# Patient Record
Sex: FEMALE | Race: WHITE
[De-identification: names, ages, dates, MRNs, and addresses within clinical notes are randomized per-mention and may not be internally consistent; named-entity substitution may affect disease eponyms.]

---

## 2018-02-21 ENCOUNTER — HOSPITAL ENCOUNTER (INPATIENT)
Dept: HOSPITAL 45 - C.EDB | Age: 58
LOS: 5 days | Discharge: HOME | DRG: 194 | End: 2018-02-26
Attending: HOSPITALIST | Admitting: HOSPITALIST
Payer: COMMERCIAL

## 2018-02-21 VITALS
SYSTOLIC BLOOD PRESSURE: 122 MMHG | OXYGEN SATURATION: 91 % | DIASTOLIC BLOOD PRESSURE: 70 MMHG | HEART RATE: 95 BPM | TEMPERATURE: 98.24 F

## 2018-02-21 VITALS — OXYGEN SATURATION: 90 % | HEART RATE: 93 BPM

## 2018-02-21 VITALS
HEIGHT: 61 IN | WEIGHT: 156.53 LBS | HEIGHT: 61 IN | BODY MASS INDEX: 29.55 KG/M2 | BODY MASS INDEX: 29.55 KG/M2 | WEIGHT: 156.53 LBS

## 2018-02-21 DIAGNOSIS — F32.9: ICD-10-CM

## 2018-02-21 DIAGNOSIS — J18.9: ICD-10-CM

## 2018-02-21 DIAGNOSIS — J10.1: Primary | ICD-10-CM

## 2018-02-21 DIAGNOSIS — J44.1: ICD-10-CM

## 2018-02-21 DIAGNOSIS — R09.02: ICD-10-CM

## 2018-02-21 DIAGNOSIS — K21.9: ICD-10-CM

## 2018-02-21 DIAGNOSIS — F17.200: ICD-10-CM

## 2018-02-21 LAB
ALBUMIN SERPL-MCNC: 3.7 GM/DL (ref 3.4–5)
ALP SERPL-CCNC: 81 U/L (ref 45–117)
ALT SERPL-CCNC: 26 U/L (ref 12–78)
AST SERPL-CCNC: 25 U/L (ref 15–37)
BASOPHILS # BLD: 0.02 K/UL (ref 0–0.2)
BASOPHILS NFR BLD: 0.2 %
BUN SERPL-MCNC: 12 MG/DL (ref 7–18)
CA-I BLD-SCNC: 1.06 MMOL/L (ref 1.12–1.32)
CALCIUM SERPL-MCNC: 8.8 MG/DL (ref 8.5–10.1)
CK MB SERPL-MCNC: 1.7 NG/ML (ref 0.5–3.6)
CO2 SERPL-SCNC: 25 MMOL/L (ref 21–32)
CREAT BLD-MCNC: 0.5 MG/DL (ref 0.6–1.3)
CREAT SERPL-MCNC: 0.68 MG/DL (ref 0.6–1.2)
EOS ABS #: 0.02 K/UL (ref 0–0.5)
EOSINOPHIL NFR BLD AUTO: 161 K/UL (ref 130–400)
GLUCOSE SERPL-MCNC: 89 MG/DL (ref 70–99)
HCT VFR BLD CALC: 41.7 % (ref 37–47)
HGB BLD-MCNC: 14.2 G/DL (ref 12–16)
IG#: 0.03 K/UL (ref 0–0.02)
IMM GRANULOCYTES NFR BLD AUTO: 5.1 %
INFLUENZA B ANTIGEN: (no result)
INR PPP: 1 (ref 0.9–1.1)
ISTAT POTASSIUM: 3.9 MEQ/L (ref 3.3–5)
LYMPHOCYTES # BLD: 0.47 K/UL (ref 1.2–3.4)
MCH RBC QN AUTO: 32.7 PG (ref 25–34)
MCHC RBC AUTO-ENTMCNC: 34.1 G/DL (ref 32–36)
MCV RBC AUTO: 96.1 FL (ref 80–100)
MONO ABS #: 0.54 K/UL (ref 0.11–0.59)
MONOCYTES NFR BLD: 5.9 %
NEUT ABS #: 8.14 K/UL (ref 1.4–6.5)
NEUTROPHILS # BLD AUTO: 0.2 %
NEUTROPHILS NFR BLD AUTO: 88.3 %
PMV BLD AUTO: 9.6 FL (ref 7.4–10.4)
POTASSIUM SERPL-SCNC: 3.6 MMOL/L (ref 3.5–5.1)
PROT SERPL-MCNC: 7.3 GM/DL (ref 6.4–8.2)
PTT PATIENT: 28.4 SECONDS (ref 21–31)
RED CELL DISTRIBUTION WIDTH CV: 14.1 % (ref 11.5–14.5)
RED CELL DISTRIBUTION WIDTH SD: 49.8 FL (ref 36.4–46.3)
SODIUM BLD-SCNC: 136 MEQ/L (ref 135–144)
SODIUM SERPL-SCNC: 135 MMOL/L (ref 136–145)
WBC # BLD AUTO: 9.22 K/UL (ref 4.8–10.8)

## 2018-02-21 RX ADMIN — IPRATROPIUM BROMIDE SCH MG: 0.5 SOLUTION RESPIRATORY (INHALATION) at 19:30

## 2018-02-21 RX ADMIN — OSELTAMIVIR PHOSPHATE SCH MG: 75 CAPSULE ORAL at 18:55

## 2018-02-21 RX ADMIN — GUAIFENESIN AND CODEINE PHOSPHATE PRN ML: 10; 100 LIQUID ORAL at 22:37

## 2018-02-21 RX ADMIN — LEVALBUTEROL SCH MG: 1.25 SOLUTION, CONCENTRATE RESPIRATORY (INHALATION) at 19:30

## 2018-02-21 RX ADMIN — PIPERACILLIN AND TAZOBACTAM SCH MLS/HR: 3; .375 INJECTION, POWDER, LYOPHILIZED, FOR SOLUTION INTRAVENOUS; PARENTERAL at 22:34

## 2018-02-21 RX ADMIN — METHYLPREDNISOLONE SODIUM SUCCINATE SCH MLS/MIN: 1 INJECTION, POWDER, FOR SOLUTION INTRAMUSCULAR; INTRAVENOUS at 22:34

## 2018-02-21 NOTE — EMERGENCY ROOM VISIT NOTE
History


Report prepared by Lam[place]:  Rafa Card


Under the Supervision of:  Dr. Toni Stahl D.O.


First contact with patient:  13:26


Chief Complaint:  FLU LIKE SX


Stated Complaint:  FLU LIKE SX





History of Present Illness


The patient is a 57 year old female who presents to the Emergency Room with 

complaints of moderate flu-like symptoms that began two days ago. She has a 

past medical history of COPD. At this time, the patient woke up in the morning 

and began to have intermittent episodes of vomiting. Yesterday, she thought she 

was febrile, but did not take her temperature. She then began to experience a 

headache, productive cough with yellow/clear sputum, and rhinorrhea. Recently, 

she has been experiencing diffuse body aches, some mild abdominal pain, 

shortness of breath, and some chest tightness that feels similarly to when her 

COPD exacerbations are severe. Pt denies headache, change in vision, fevers, 

nausea, diarrhea, pain with urination, and melena.





   Source of History:  patient


   Onset:  two days ago


   Position:  other (Global)


   Symptom Intensity:  moderate


   Quality:  other (Flu-like symptoms)


   Timing:  constant


   Associated Symptoms:  + cough, + chest pain (tightness), + SOB, + vomiting, 

+ abdominal pain, No fevers, No headache, No melena, No diarrhea, No urinary 

symptoms


Note:


She is experiencing body aches.





Review of Systems


See HPI for pertinent positives & negatives. A total of 10 systems reviewed and 

were otherwise negative.





Past Medical & Surgical


Medical Problems:


(1) COPD (chronic obstructive pulmonary disease)


(2) Fibromyalgia


(3) HLD (hyperlipidemia)


(4) HTN (hypertension)


(5) Influenza A


Surgical Problems:


(1) No pertinent past surgical history


Social History Problems:


(1) Active smoker








Family History





Depression


Diabetes mellitus





Social History


Smoking Status:  Current Every Day Smoker


Drug Use:  none


Marital Status:  


Housing Status:  lives with family


Occupation Status:  unemployed





Current/Historical Medications


Scheduled


Albuterol Hfa (Ventolin Hfa), 2 PUFFS INH Q4H


Citalopram (Citalopram Hydrobromide), 40 MG PO DAILY


Fluticasone Furoate-Vilanterol (Breo Ellipta), 1 PUFF INH QAM


Omeprazole (Omeprazole), 1 TAB PO DAILY


Umeclidinium Bromide (Incruse Ellipta), 1 PUFF INH DAILY





Scheduled PRN


Albuterol Sulfate (Proair Respiclick), 2 PUFFS INH QID PRN for SOB/Wheezing


Ibuprofen (Advil), 400-600 MG PO Q6H PRN for Pain


Ipratropium-Albuterol (Duoneb), 1 TREATMENT INH Q6H PRN for SOB/Wheezing





Allergies


Coded Allergies:  


     ACE Inhibitors (Verified  Allergy, Unknown, UNKNOWN RXN, 2/21/18)


     Zolpidem (Verified  Adverse Reaction, Intermediate, other, 2/21/18)


 irritability, restless legs, wanted to wake up at night


 during sleep





Physical Exam


Vital Signs











  Date Time  Temp Pulse Resp B/P (MAP) Pulse Ox O2 Delivery O2 Flow Rate FiO2


 


2/21/18 14:46  103 15 104/66 92 Nasal Cannula 2.0 


 


2/21/18 14:07  110      


 


2/21/18 13:27     93 Nasal Cannula 4.0 


 


2/21/18 13:22 38.0 118 40 139/72 80 Room Air  











Physical Exam


GENERAL: Sitting up in bed, dyspneic with conversation, significant distress, 

non-toxic 


EYE EXAM: normal conjunctiva.


OROPHARYNX: no exudate, no erythema, lips, buccal mucosa, and tongue normal and 

mucous membranes are moist


NECK: supple, no nuchal rigidity, no adenopathy, non-tender, no JVD


LUNGS: Wheezing bilaterally. Normal chest wall mechanics


HEART: Tachycardic rate with a regular rhythm, no murmurs, S1 normal and S2 

normal 


ABDOMEN: abdomen soft, non-tender, normo-active bowel sounds, no masses, no 

rebound or guarding. 


BACK: Back is symmetrical on inspection and there is no deformity, no midline 

tenderness, no CVA tenderness. 


SKIN: no rashes and no bruising 


UPPER EXTREMITIES: upper extremities are grossly normal. 


LOWER EXTREMITIES: No pitting edema.


NEURO EXAM: Normal sensorium, cranial nerves II-XII grossly intact, normal 

speech,  no gross weakness of arms, no gross weakness of legs.





Medical Decision & Procedures


ER Provider


Diagnostic Interpretation:


Radiology results as stated below per my review and the radiologist's 

interpretation: 





CHEST ONE VIEW PORTABLE





CLINICAL HISTORY: Fever.    





COMPARISON STUDY:  Chest radiograph April 13, 2017.





FINDINGS: There is no pneumothorax or pleural effusion. There is no evidence for


pulmonary edema. Cardiac size is normal. Mediastinal contours are normal. There


is mild reticulonodular interstitial thickening within the right lower lung.


There is no lobar consolidation. There is no cavitation. 





IMPRESSION:  Mild right lower lung reticulonodular interstitial thickening which


may reflect a mild infectious process such as bronchiolitis. 





Electronically signed by:  Pardeep Perla M.D.


2/21/2018 1:56 PM





Dictated Date/Time:  2/21/2018 1:54 PM





Laboratory Results


2/21/18 13:39








Red Blood Count 4.34, Mean Corpuscular Volume 96.1, Mean Corpuscular Hemoglobin 

32.7, Mean Corpuscular Hemoglobin Concent 34.1, Mean Platelet Volume 9.6, 

Neutrophils (%) (Auto) 88.3, Lymphocytes (%) (Auto) 5.1, Monocytes (%) (Auto) 

5.9, Eosinophils (%) (Auto) 0.2, Basophils (%) (Auto) 0.2, Neutrophils # (Auto) 

8.14, Lymphocytes # (Auto) 0.47, Monocytes # (Auto) 0.54, Eosinophils # (Auto) 

0.02, Basophils # (Auto) 0.02





2/21/18 13:39

















Test


  2/21/18


13:39 2/21/18


13:58 2/21/18


14:00 2/21/18


14:21


 


White Blood Count


  9.22 K/uL


(4.8-10.8) 


  


  


 


 


Red Blood Count


  4.34 M/uL


(4.2-5.4) 


  


  


 


 


Hemoglobin


  14.2 g/dL


(12.0-16.0) 


  


  


 


 


Hematocrit 41.7 % (37-47)    


 


Mean Corpuscular Volume


  96.1 fL


() 


  


  


 


 


Mean Corpuscular Hemoglobin


  32.7 pg


(25-34) 


  


  


 


 


Mean Corpuscular Hemoglobin


Concent 34.1 g/dl


(32-36) 


  


  


 


 


Platelet Count


  161 K/uL


(130-400) 


  


  


 


 


Mean Platelet Volume


  9.6 fL


(7.4-10.4) 


  


  


 


 


Neutrophils (%) (Auto) 88.3 %    


 


Lymphocytes (%) (Auto) 5.1 %    


 


Monocytes (%) (Auto) 5.9 %    


 


Eosinophils (%) (Auto) 0.2 %    


 


Basophils (%) (Auto) 0.2 %    


 


Neutrophils # (Auto)


  8.14 K/uL


(1.4-6.5) 


  


  


 


 


Lymphocytes # (Auto)


  0.47 K/uL


(1.2-3.4) 


  


  


 


 


Monocytes # (Auto)


  0.54 K/uL


(0.11-0.59) 


  


  


 


 


Eosinophils # (Auto)


  0.02 K/uL


(0-0.5) 


  


  


 


 


Basophils # (Auto)


  0.02 K/uL


(0-0.2) 


  


  


 


 


RDW Standard Deviation


  49.8 fL


(36.4-46.3) 


  


  


 


 


RDW Coefficient of Variation


  14.1 %


(11.5-14.5) 


  


  


 


 


Immature Granulocyte % (Auto) 0.3 %    


 


Immature Granulocyte # (Auto)


  0.03 K/uL


(0.00-0.02) 


  


  


 


 


Est Creatinine Clear Calc


Drug Dose 80.2 ml/min 


  


  


  


 


 


Estimated GFR (


American) 111.0 


  


  


  


 


 


Estimated GFR (Non-


American 95.7 


  


  


  


 


 


BUN/Creatinine Ratio 17.3 (10-20)    


 


Calcium Level


  8.8 mg/dl


(8.5-10.1) 


  


  


 


 


Magnesium Level


  2.0 mg/dl


(1.8-2.4) 


  


  


 


 


Total Bilirubin


  0.4 mg/dl


(0.2-1) 


  


  


 


 


Direct Bilirubin


  < 0.1 mg/dl


(0-0.2) 


  


  


 


 


Aspartate Amino Transf


(AST/SGOT) 25 U/L (15-37) 


  


  


  


 


 


Alanine Aminotransferase


(ALT/SGPT) 26 U/L (12-78) 


  


  


  


 


 


Alkaline Phosphatase


  81 U/L


() 


  


  


 


 


Total Creatine Kinase


  125 U/L


() 


  


  


 


 


Creatine Kinase MB


  1.7 ng/ml


(0.5-3.6) 


  


  


 


 


Creatine Kinase MB Ratio 1.4 (0-3.0)    


 


Troponin I


  < 0.015 ng/ml


(0-0.045) 


  


  


 


 


Total Protein


  7.3 gm/dl


(6.4-8.2) 


  


  


 


 


Albumin


  3.7 gm/dl


(3.4-5.0) 


  


  


 


 


Bedside Lactic Acid Venous


  


  1.11 mmol/L


(0.90-1.70) 


  


 


 


Influenza Type A Antigen


  


  


  POS for Influ


A (NEG) 


 


 


Influenza Type B Antigen


  


  


  Neg for Influ


B (NEG) 


 


 


Bedside Hemoglobin


  


  


  


  15.3 g/dl


(12.0-16.0)


 


Bedside Hematocrit    45 % (37-47) 


 


Bedside Sodium


  


  


  


  136 mEq/L


(135-144)


 


Bedside Potassium


  


  


  


  3.9 mEq/L


(3.3-5.0)


 


Bedside Chloride


  


  


  


  98 mEq/L


(101-112)


 


Bedside Total CO2


  


  


  


  26 mEq/l


(24-31)


 


Anion Gap


  


  


  


  16.0 mmol/L


(16-25)


 


Bedside Blood Urea Nitrogen


  


  


  


  13 mg/dl


(7-18)


 


Bedside Creatinine


  


  


  


  0.5 mg/dl


(0.6-1.3)


 


Bedside Glucose (other)


  


  


  


  89 mg/dl


(70-99)


 


Bedside Ionized Calcium (Heide)


  


  


  


  1.06 mmol/l


(1.12-1.32)


 


Test


  2/21/18


14:30 2/21/18


15:50 


  


 


 


Prothrombin Time


  10.5 SECONDS


(9.0-12.0) 


  


  


 


 


Prothromb Time International


Ratio 1.0 (0.9-1.1) 


  


  


  


 


 


Activated Partial


Thromboplast Time 28.4 SECONDS


(21.0-31.0) 


  


  


 


 


Partial Thromboplastin Ratio 1.1    


 


Urine Color  YELLOW   


 


Urine Appearance  CLEAR (CLEAR)   


 


Urine pH  6.0 (4.5-7.5)   


 


Urine Specific Gravity


  


  1.020


(1.000-1.030) 


  


 


 


Urine Protein  NEG (NEG)   


 


Urine Glucose (UA)  NEG (NEG)   


 


Urine Ketones  2+ (NEG)   


 


Urine Occult Blood  NEG (NEG)   


 


Urine Nitrite  NEG (NEG)   


 


Urine Bilirubin  NEG (NEG)   


 


Urine Urobilinogen  NEG (NEG)   


 


Urine Leukocyte Esterase  NEG (NEG)   


 


Urine Hyaline Casts (Auto)  0 /lpf (0-5)   


 


Urine RBC  0-4 /hpf (0-4)   


 


Urine WBC  0 /hpf (0-5)   


 


Urine Epithelial Cells


  


  10-20 /lpf


(0-5) 


  


 


 


Urine Bacteria  NEG (NEG)   





Laboratory results per my review.





Medications Administered











 Medications


  (Trade)  Dose


 Ordered  Sig/Carl


 Route  Start Time


 Stop Time Status Last Admin


Dose Admin


 


 Albuterol Sulfate


  (Ventolin 0.083%


 2.5MG/3ML Neb)  5 mg  NOW  STAT


 INH  2/21/18 13:34


 2/21/18 13:36 DC 2/21/18 13:53


5 MG


 


 Methylprednisolone


 Sodium Succinate


  (Solu-Medrol IV)  125 mg  NOW  STAT


 IV  2/21/18 13:34


 2/21/18 13:36 DC 2/21/18 13:53


125 MG


 


 Sodium Chloride  2,000 ml @ 


 999 mls/hr  Q2H1M STAT


 IV  2/21/18 13:34


 2/21/18 15:34 DC 2/21/18 13:53


999 MLS/HR


 


 Hydrocodone Bit/


 Homatropine


 Methylb


  (Hycodan Syrup)  5 ml  NOW  STAT


 PO  2/21/18 13:34


 2/21/18 13:36 DC 2/21/18 13:54


5 ML


 


 Acetaminophen


  (Tylenol Tab)  1,000 mg  NOW  STAT


 PO  2/21/18 13:35


 2/21/18 13:36 DC 2/21/18 13:53


1,000 MG


 


 Levofloxacin


  (Levaquin / D5W)  750 mg  NOW  STAT


 IV  2/21/18 13:35


 2/21/18 13:36 DC 2/21/18 13:54


750 MG











ECG Per My Interpretation


Indication:  tachycardia


Rate (beats per minute):  115


Rhythm:  sinus tachycardia


Findings:  ST depression (diffuse), other (Normal axis, prolonged QTC)





ED Course


ED COURSE: 


Vital signs were reviewed and showed tachycardia, hypoxia, and febrile


The patients medical record was reviewed


The above diagnostic studies were performed and reviewed.


ED treatments and interventions as stated above. 





1329: The patient was evaluated in room C6. A complete history and physical 

examination was performed.





1334: Ordered Hycodan Syrup 5 ml PO, Sodium Chloride 2000 ml @ 999 mls/hr IV, 

Solu-Medrol  mg IV, Albuterol Sulfate 5 mg INH





1335: Ordered Levofloxacin 750 mg IV, Tylenol Tab 1000 mg PO





1443: I updated the patient at this time.





1500: Ordered Tamiflu Susp 75 mg PO





1502: Upon reevaluation, the patient is resting.I discussed my findings with 

the patient and she understands and agrees with the treatment plan.   


Based on the patients age, coexisting illnesses, exam and lab findings the 

decision to treat as an inpatient was made.


The patient remained stable while under my care.





The patient will be evaluated by Marcy Montanez, for 

further management.





Medical Decision


Differential diagnoses includes but is not limited to pneumonia, bronchitis, 

COPD/Asthma exacerbation, pneumothorax, pulmonary embolism, congestive heart 

failure, acute coronary syndrome.





Patient is a 57-year-old female who presents to ER in severe respiratory 

distress.  Pulse ox was found to be 80% and she is tachycardic and febrile.  

She is dyspneic with conversation.  She was given multiple nebs, steroids and 

antibiotics.  Symptoms did improve significantly.  She was positive for 

influenza.  She remained on 4-5 L nasal cannula.  She was admitted to internal 

medicine with likely COPD exacerbation secondary to influenza.  Patient was 

monitored closely throughout her stay within the ER.





Medication Reconcilliation


Current Medication List:  was personally reviewed by me





Blood Pressure Screening


Patient's blood pressure:  Normal blood pressure


Blood pressure disposition:  Did not require urgent referral





Consults


Time Called:  1458


Consulting Physician:  Marcy Montanez Hospitalist


Returned Call:  [phone]


I reviewed the patient's case with her.  She will evaluate the patient for 

further management.





Impression





 Primary Impression:  


 Influenza A


 Additional Impressions:  


 Pneumonia


 Hypoxia


 COPD exacerbation





Scribe Attestation


The scribe's documentation has been prepared under my direction and personally 

reviewed by me in its entirety. I confirm that the note above accurately 

reflects all work, treatment, procedures, and medical decision making performed 

by me.





Departure Information


Dispostion


Being Evaluated By Hospitalist





Referrals


April Mc D.O. (PCP)





Patient Instructions


[facility]





Problem Qualifiers








 Additional Impressions:  


 Pneumonia


 Pneumonia type:  due to unspecified organism  Laterality:  unspecified 

laterality  Lung location:  unspecified part of lung  Qualified Codes:  J18.9 - 

Pneumonia, unspecified organism

## 2018-02-21 NOTE — HISTORY AND PHYSICAL
History & Physical


Date & Time of Service:


Feb 21, 2018 at 18:29


Chief Complaint:


Influenza A


Primary Care Physician:


April Mc D.O.


History of Present Illness


Source:  patient, clinic records, hospital records


Pt is 56 y/o F with PMH COPD, nocturnal hypemia, depression, GERD, tobacco use 

presented to ER with c/o fever, increased SOB and cough. Pt reports 2 days ago 

started with myalgias, cough, increased SOB and tactile fever. Uses oxygen @2L 

HS chronically. Reports past couple of days has been checking pulse ox at home 

and sat's 86% and she has been using her oxygen throughout the day. Worsening 

symptoms today prompted ER visit. Reports granddaughter with URI symptoms 

recently. Pt states 2 days ago had couple episodes of vomiting and one of 

diarrhea which has since resolved. Denies HA, dizziness, syncope, vision changes

, neck pain, CP, orthopnea, palpitations,hemoptysis, choking, otalgia, 

abdominal pain, paresthesias, extremity edema, rashes, urinary symptoms.





Past Medical/Surgical History


Medical Problems:


(1) COPD (chronic obstructive pulmonary disease)


Status: Chronic  





(2) Depression


Status: Chronic  





(3) Fibromyalgia


Status: Chronic  





(4) GERD (gastroesophageal reflux disease)


Status: Chronic  





(5) HLD (hyperlipidemia)


Status: Chronic  





(6) HTN (hypertension)


Status: Chronic  





Surgical Problems:


(1) Hx of blepharoplasty


Status: Resolved  





(2) No pertinent past surgical history


Status: Chronic  





Social History Problems:


(1) Active smoker


Status: Chronic  








Family History





Depression


Diabetes mellitus





Social History


Smoking Status:  Current Every Day Smoker (1-1.5ppd x 30 years)


Smokeless Tobacco Use:  No


Alcohol Use:  occasionally


Drug Use:  none


Marital Status:  


Housing status:  lives with family


Occupational Status:  unemployed





Immunizations


History of Influenza Vaccine:  No


Influenza Vaccine Date:  Oct 20, 2008


History of Tetanus Vaccine?:  No


History of Pneumococcal:  Yes


History of Hepatitis B Vaccine:  No





Multi-Drug Resistant Organisms


History of MDRO:  No





Allergies


Coded Allergies:  


     ACE Inhibitors (Verified  Allergy, Unknown, UNKNOWN RXN, 2/21/18)


     Zolpidem (Verified  Adverse Reaction, Intermediate, other, 2/21/18)


 irritability, restless legs, wanted to wake up at night


 during sleep





Home Medications


Scheduled


Albuterol Hfa (Ventolin Hfa), 2 PUFFS INH Q4H


Citalopram (Citalopram Hydrobromide), 40 MG PO DAILY


Fluticasone Furoate-Vilanterol (Breo Ellipta), 1 PUFF INH QAM


Omeprazole (Omeprazole), 1 TAB PO DAILY


Umeclidinium Bromide (Incruse Ellipta), 1 PUFF INH DAILY





Scheduled PRN


Albuterol Sulfate (Proair Respiclick), 2 PUFFS INH QID PRN for SOB/Wheezing


Ibuprofen (Advil), 400-600 MG PO Q6H PRN for Pain


Ipratropium-Albuterol (Duoneb), 1 TREATMENT INH Q6H PRN for SOB/Wheezing





Review of Systems


See HPI for pertinent positives & negatives. All other systems reviewed and 

were otherwise negative





Physical Exam


Vital Signs











  Date Time  Temp Pulse Resp B/P (MAP) Pulse Ox O2 Delivery O2 Flow Rate FiO2


 


2/21/18 17:00 36.8 95 20 122/70 91 Nasal Cannula 4.0 


 


2/21/18 16:27 36.8 92 22  94 Nasal Cannula 4.0 


 


2/21/18 15:53  94 20 130/71 94 Nasal Cannula 4.0 


 


2/21/18 14:46  103 15 104/66 92 Nasal Cannula 2.0 


 


2/21/18 14:07  110      


 


2/21/18 13:27     93 Nasal Cannula 4.0 


 


2/21/18 13:22 38.0 118 40 139/72 80 Room Air  








General Appearance:  WD/WN, no apparent distress


Head:  normocephalic, atraumatic


Eyes:  normal inspection, PERRL, EOMI, sclerae normal


ENT:  hearing grossly normal, pharynx normal, + pertinent finding (mucous 

membranes moist)


Neck:  supple, no JVD, trachea midline


Respiratory/Chest:  no respiratory distress, no accessory muscle use, + 

decreased breath sounds, + wheezing


Cardiovascular:  regular rate, rhythm, no murmur, normal peripheral pulses


Abdomen/GI:  normal bowel sounds, non tender, soft


Extremities/Musculoskelatal:  normal inspection, normal capillary refill, no 

pedal edema, normal range of motion, non-tender


Neurologic/Psych:  alert, normal mood/affect, oriented x 3


Skin:  normal color, warm/dry





Diagnostics


Laboratory Results





Results Past 24 Hours








Test


  2/21/18


13:39 2/21/18


13:58 2/21/18


14:00 2/21/18


14:21 Range/Units


 


 


White Blood Count 9.22    4.8-10.8  K/uL


 


Red Blood Count 4.34    4.2-5.4  M/uL


 


Hemoglobin 14.2    12.0-16.0  g/dL


 


Hematocrit 41.7    37-47  %


 


Mean Corpuscular Volume 96.1      fL


 


Mean Corpuscular Hemoglobin 32.7    25-34  pg


 


Mean Corpuscular Hemoglobin


Concent 34.1


  


  


  


  32-36  g/dl


 


 


Platelet Count 161    130-400  K/uL


 


Mean Platelet Volume 9.6    7.4-10.4  fL


 


Neutrophils (%) (Auto) 88.3     %


 


Lymphocytes (%) (Auto) 5.1     %


 


Monocytes (%) (Auto) 5.9     %


 


Eosinophils (%) (Auto) 0.2     %


 


Basophils (%) (Auto) 0.2     %


 


Neutrophils # (Auto) 8.14    1.4-6.5  K/uL


 


Lymphocytes # (Auto) 0.47    1.2-3.4  K/uL


 


Monocytes # (Auto) 0.54    0.11-0.59  K/uL


 


Eosinophils # (Auto) 0.02    0-0.5  K/uL


 


Basophils # (Auto) 0.02    0-0.2  K/uL


 


RDW Standard Deviation 49.8    36.4-46.3  fL


 


RDW Coefficient of Variation 14.1    11.5-14.5  %


 


Immature Granulocyte % (Auto) 0.3     %


 


Immature Granulocyte # (Auto) 0.03    0.00-0.02  K/uL


 


Sodium Level 135    136-145  mmol/L


 


Potassium Level 3.6    3.5-5.1  mmol/L


 


Chloride Level 100      mmol/L


 


Carbon Dioxide Level 25    21-32  mmol/L


 


Anion Gap 10.0   16.0 16-25  mmol/L


 


Blood Urea Nitrogen 12    7-18  mg/dl


 


Creatinine


  0.68


  


  


  


  0.60-1.20


mg/dl


 


Est Creatinine Clear Calc


Drug Dose 80.2


  


  


  


   ml/min


 


 


Estimated GFR (


American) 111.0


  


  


  


   


 


 


Estimated GFR (Non-


American 95.7


  


  


  


   


 


 


BUN/Creatinine Ratio 17.3    10-20  


 


Random Glucose 89    70-99  mg/dl


 


Calcium Level 8.8    8.5-10.1  mg/dl


 


Magnesium Level 2.0    1.8-2.4  mg/dl


 


Total Bilirubin 0.4    0.2-1  mg/dl


 


Direct Bilirubin < 0.1    0-0.2  mg/dl


 


Aspartate Amino Transf


(AST/SGOT) 25


  


  


  


  15-37  U/L


 


 


Alanine Aminotransferase


(ALT/SGPT) 26


  


  


  


  12-78  U/L


 


 


Alkaline Phosphatase 81      U/L


 


Total Creatine Kinase 125      U/L


 


Creatine Kinase MB 1.7    0.5-3.6  ng/ml


 


Creatine Kinase MB Ratio 1.4    0-3.0  


 


Troponin I < 0.015    0-0.045  ng/ml


 


Total Protein 7.3    6.4-8.2  gm/dl


 


Albumin 3.7    3.4-5.0  gm/dl


 


Bedside Lactic Acid Venous


  


  1.11


  


  


  0.90-1.70


mmol/L


 


Influenza Type A Antigen   POS for Influ A  NEG  


 


Influenza Type B Antigen   Neg for Influ B  NEG  


 


Bedside Hemoglobin    15.3 12.0-16.0  g/dl


 


Bedside Hematocrit    45 37-47  %


 


Bedside Sodium    136 135-144  mEq/L


 


Bedside Potassium    3.9 3.3-5.0  mEq/L


 


Bedside Chloride    98 101-112  mEq/L


 


Bedside Total CO2    26 24-31  mEq/l


 


Bedside Blood Urea Nitrogen    13 7-18  mg/dl


 


Bedside Creatinine    0.5 0.6-1.3  mg/dl


 


Bedside Glucose (other)    89 70-99  mg/dl


 


Bedside Ionized Calcium (Heide)


  


  


  


  1.06


  1.12-1.32


mmol/l


 


Test


  2/21/18


14:30 2/21/18


15:50 


  


  Range/Units


 


 


Prothrombin Time


  10.5


  


  


  


  9.0-12.0


SECONDS


 


Prothromb Time International


Ratio 1.0


  


  


  


  0.9-1.1  


 


 


Activated Partial


Thromboplast Time 28.4


  


  


  


  21.0-31.0


SECONDS


 


Partial Thromboplastin Ratio 1.1     


 


Urine Color  YELLOW    


 


Urine Appearance  CLEAR   CLEAR  


 


Urine pH  6.0   4.5-7.5  


 


Urine Specific Gravity  1.020   1.000-1.030  


 


Urine Protein  NEG   NEG  


 


Urine Glucose (UA)  NEG   NEG  


 


Urine Ketones  2+   NEG  


 


Urine Occult Blood  NEG   NEG  


 


Urine Nitrite  NEG   NEG  


 


Urine Bilirubin  NEG   NEG  


 


Urine Urobilinogen  NEG   NEG  


 


Urine Leukocyte Esterase  NEG   NEG  


 


Urine Hyaline Casts (Auto)  0   0-5  /lpf


 


Urine RBC  0-4   0-4  /hpf


 


Urine WBC  0   0-5  /hpf


 


Urine Epithelial Cells  10-20   0-5  /lpf


 


Urine Bacteria  NEG   NEG  








Microbiology Results


2/21/18 Blood Culture, Received


          Pending


2/21/18 Blood Culture, Received


          Pending





Diagnostic Radiology


CXR:


IMPRESSION:  Mild right lower lung reticulonodular interstitial thickening which


may reflect a mild infectious process such as bronchiolitis.





EKG


EKG: sinus tachycardia, non-specific ST changes anterior, lateral leads





Impression


Assessment and Plan


HYPOXIA SECONDARY TO INFLUENZA AND COPD EXACERBATION


POSSIBLE PNEUMONIA 


Pt with onset fever, myalgias, cough 2 days ago with increased SOB and hypoxia 

with home sat's in mid 80's on RA. In ER O2 sat 80% on RA up to 93% on 4L NC. T

: 38. Pulse: 118. Negative POC lactic acid. Troponin negative. CXR: Mild right 

lower lung reticulonodular interstitial thickening which may reflect a mild 

infectious process such as bronchiolitis. Pt given zosyn, duoneb, solumedrol 

125mg


-tamilfu


-zosyn


-NSS 80ml/hr


-xopenex/atrovent neb


-supplemental O2, continue HS O2


-solumedrol 40mg QH


-continue home inhalers


-repeat labs in am





GERD


-continue PPI





DEPRESSION/ANXIETY


-continue celexa 





TOBACCO ABUSE


-smoking cessation discussed


-nicotine patch





DVT Prophylaxis


-SCDs





Disposition


admit med/surg


Full Code


Follows with Dr Mc for routine care





Pt was seen with Dr Billy. See addendum





ATTENDING NOTE : 


pt seen and examined, care co -ordniated with Marcy Serna PA-C 





56 yo F presented with hypoxia , cough 


flu like symptom 


Influenza A ag positive 


started on Tamiflu 


IV hydration 


Duo neb/IV steroids for COPD exacerbation 





Dianne Billy MD





Level of Care


Med/Surg





Advanced Directives


Existing Living Will:  No


Existing Power of :  No





Resuscitation Status


FULL RESUSCITATION





VTE Prophylaxis


VTE Risk Assessment Done? Y/N:  Yes


Risk Level:  Moderate


Given or contraindicated:  SCD's





Additional Copies To


April Mc D.O.

## 2018-02-21 NOTE — DIAGNOSTIC IMAGING REPORT
CHEST ONE VIEW PORTABLE



CLINICAL HISTORY: Fever.    



COMPARISON STUDY:  Chest radiograph April 13, 2017.



FINDINGS: There is no pneumothorax or pleural effusion. There is no evidence for

pulmonary edema. Cardiac size is normal. Mediastinal contours are normal. There

is mild reticulonodular interstitial thickening within the right lower lung.

There is no lobar consolidation. There is no cavitation. 



IMPRESSION:  Mild right lower lung reticulonodular interstitial thickening which

may reflect a mild infectious process such as bronchiolitis. 









Electronically signed by:  Pardeep Perla M.D.

2/21/2018 1:56 PM



Dictated Date/Time:  2/21/2018 1:54 PM

## 2018-02-22 VITALS
DIASTOLIC BLOOD PRESSURE: 68 MMHG | TEMPERATURE: 98.24 F | HEART RATE: 75 BPM | SYSTOLIC BLOOD PRESSURE: 110 MMHG | OXYGEN SATURATION: 97 %

## 2018-02-22 VITALS
TEMPERATURE: 98.06 F | HEART RATE: 72 BPM | SYSTOLIC BLOOD PRESSURE: 100 MMHG | OXYGEN SATURATION: 97 % | DIASTOLIC BLOOD PRESSURE: 65 MMHG

## 2018-02-22 VITALS — OXYGEN SATURATION: 94 % | HEART RATE: 85 BPM

## 2018-02-22 VITALS — HEART RATE: 95 BPM | OXYGEN SATURATION: 93 %

## 2018-02-22 VITALS
HEART RATE: 81 BPM | SYSTOLIC BLOOD PRESSURE: 125 MMHG | OXYGEN SATURATION: 95 % | TEMPERATURE: 98.78 F | DIASTOLIC BLOOD PRESSURE: 85 MMHG

## 2018-02-22 VITALS — OXYGEN SATURATION: 95 % | HEART RATE: 95 BPM

## 2018-02-22 VITALS — HEART RATE: 82 BPM | OXYGEN SATURATION: 97 %

## 2018-02-22 VITALS — OXYGEN SATURATION: 97 %

## 2018-02-22 LAB
BUN SERPL-MCNC: 11 MG/DL (ref 7–18)
CALCIUM SERPL-MCNC: 7.9 MG/DL (ref 8.5–10.1)
CO2 SERPL-SCNC: 27 MMOL/L (ref 21–32)
CREAT SERPL-MCNC: 0.66 MG/DL (ref 0.6–1.2)
EOSINOPHIL NFR BLD AUTO: 160 K/UL (ref 130–400)
GLUCOSE SERPL-MCNC: 125 MG/DL (ref 70–99)
HCT VFR BLD CALC: 38.2 % (ref 37–47)
HGB BLD-MCNC: 12.9 G/DL (ref 12–16)
MCH RBC QN AUTO: 32.7 PG (ref 25–34)
MCHC RBC AUTO-ENTMCNC: 33.8 G/DL (ref 32–36)
MCV RBC AUTO: 97 FL (ref 80–100)
PMV BLD AUTO: 9.8 FL (ref 7.4–10.4)
POTASSIUM SERPL-SCNC: 3.7 MMOL/L (ref 3.5–5.1)
RED CELL DISTRIBUTION WIDTH CV: 14 % (ref 11.5–14.5)
RED CELL DISTRIBUTION WIDTH SD: 49.7 FL (ref 36.4–46.3)
SODIUM SERPL-SCNC: 139 MMOL/L (ref 136–145)
WBC # BLD AUTO: 7.51 K/UL (ref 4.8–10.8)

## 2018-02-22 RX ADMIN — IPRATROPIUM BROMIDE SCH MG: 0.5 SOLUTION RESPIRATORY (INHALATION) at 19:29

## 2018-02-22 RX ADMIN — PIPERACILLIN AND TAZOBACTAM SCH MLS/HR: 3; .375 INJECTION, POWDER, LYOPHILIZED, FOR SOLUTION INTRAVENOUS; PARENTERAL at 06:23

## 2018-02-22 RX ADMIN — ACETAMINOPHEN PRN MG: 325 TABLET ORAL at 14:10

## 2018-02-22 RX ADMIN — ACETAMINOPHEN PRN MG: 325 TABLET ORAL at 04:03

## 2018-02-22 RX ADMIN — IPRATROPIUM BROMIDE SCH MG: 0.5 SOLUTION RESPIRATORY (INHALATION) at 14:02

## 2018-02-22 RX ADMIN — METHYLPREDNISOLONE SODIUM SUCCINATE SCH MLS/MIN: 1 INJECTION, POWDER, FOR SOLUTION INTRAMUSCULAR; INTRAVENOUS at 06:22

## 2018-02-22 RX ADMIN — IPRATROPIUM BROMIDE SCH MG: 0.5 SOLUTION RESPIRATORY (INHALATION) at 02:03

## 2018-02-22 RX ADMIN — IPRATROPIUM BROMIDE SCH MG: 0.5 SOLUTION RESPIRATORY (INHALATION) at 07:15

## 2018-02-22 RX ADMIN — GUAIFENESIN AND CODEINE PHOSPHATE PRN ML: 10; 100 LIQUID ORAL at 13:45

## 2018-02-22 RX ADMIN — METHYLPREDNISOLONE SODIUM SUCCINATE SCH MLS/MIN: 1 INJECTION, POWDER, FOR SOLUTION INTRAMUSCULAR; INTRAVENOUS at 14:10

## 2018-02-22 RX ADMIN — LEVALBUTEROL SCH MG: 1.25 SOLUTION, CONCENTRATE RESPIRATORY (INHALATION) at 02:03

## 2018-02-22 RX ADMIN — CITALOPRAM HYDROBROMIDE SCH MG: 40 TABLET ORAL at 09:20

## 2018-02-22 RX ADMIN — CEFTRIAXONE SCH MLS/HR: 1 INJECTION, POWDER, FOR SOLUTION INTRAMUSCULAR; INTRAVENOUS at 14:11

## 2018-02-22 RX ADMIN — LEVALBUTEROL SCH MG: 1.25 SOLUTION, CONCENTRATE RESPIRATORY (INHALATION) at 07:15

## 2018-02-22 RX ADMIN — METHYLPREDNISOLONE SODIUM SUCCINATE SCH MLS/MIN: 1 INJECTION, POWDER, FOR SOLUTION INTRAMUSCULAR; INTRAVENOUS at 20:22

## 2018-02-22 RX ADMIN — LEVALBUTEROL SCH MG: 1.25 SOLUTION, CONCENTRATE RESPIRATORY (INHALATION) at 14:02

## 2018-02-22 RX ADMIN — LEVALBUTEROL SCH MG: 1.25 SOLUTION, CONCENTRATE RESPIRATORY (INHALATION) at 19:29

## 2018-02-22 RX ADMIN — OSELTAMIVIR PHOSPHATE SCH MG: 75 CAPSULE ORAL at 20:00

## 2018-02-22 RX ADMIN — ACETAMINOPHEN PRN MG: 325 TABLET ORAL at 20:34

## 2018-02-22 RX ADMIN — AZITHROMYCIN MONOHYDRATE SCH MLS/HR: 500 INJECTION, POWDER, LYOPHILIZED, FOR SOLUTION INTRAVENOUS at 15:25

## 2018-02-22 RX ADMIN — OSELTAMIVIR PHOSPHATE SCH MG: 75 CAPSULE ORAL at 08:00

## 2018-02-22 RX ADMIN — NICOTINE SCH PATCH: 21 PATCH, EXTENDED RELEASE TRANSDERMAL at 09:21

## 2018-02-22 NOTE — PROGRESS NOTE
Internal Med Progress Note


Date of Service:


Feb 22, 2018.


Provider Documentation:





SUBJECTIVE:





The patient was seen and examined 


Admitted with Flu and Pneumonia 


Clinically not any better yet 








OBJECTIVE:





Vital Signs-as noted below





Exam:


General-Moderate distress at rest


Eyes-Normal


ENT-Normal


Neck-Supple


Lungs-Decreased breaths sound bilaterally 


Minimal wheezing 


Heart-Regular. no murmur appreciated 


Abdomen-Benign,no masses,bowel sound present 


Extremities-Trace edema bilaterally  


Neuro-AAOx3





Lab data as noted below.


ASSESSMENT & PLAN:





Pt is 58 y/o F with PMH COPD, nocturnal hypoxemia, depression, GERD, tobacco 

use presented to ER with c/o fever, increased SOB and cough. Pt reports 2 days 

ago started with myalgias, cough, increased SOB and tactile fever. Uses oxygen @

2L HS chronically.


Negative POC lactic acid. Troponin negative. CXR: Mild right lower lung 

reticulonodular interstitial thickening which may reflect a mild infectious 

process such as bronchiolitis.





HYPOXIA SECONDARY TO INFLUENZA AND COPD EXACERBATION


POSSIBLE PNEUMONIA 


Started on  Zosyn, DuoNeb, and Solumedrol and Tamiflu


Supplemental O2, continue HS O2


Continue home inhalers


Clinically a little better today 





GERD


Continue PPI





DEPRESSION/ANXIETY


Continue Celexa 





TOBACCO ABUSE


Smoking cessation discussed


Nicotine patch





DVT Prophylaxis


-SCDs





Disposition


admit med/surg


Full Code


Follows with Dr Mc for routine care


Vital Signs:











  Date Time  Temp Pulse Resp B/P (MAP) Pulse Ox O2 Delivery O2 Flow Rate FiO2


 


2/22/18 16:08 36.7 72 17 100/65 (77) 97 Nasal Cannula 3.0 


 


2/22/18 14:02  95 18  93 Nasal Cannula 3.0 


 


2/22/18 10:48     97 Nasal Cannula 2.0 


 


2/22/18 08:06 36.8 75 18 110/68 (82) 97 Nasal Cannula 2.0 


 


2/22/18 07:15  82 18  97 Nasal Cannula 3.0 


 


2/22/18 02:03  85 18  94 Nasal Cannula 3.0 


 


2/22/18 00:00 37.1 81 18 125/85 (98) 94 Nasal Cannula 3.0 


 


2/22/18 00:00     95 Nasal Cannula 3.0 


 


2/21/18 19:30  93 20  90 Nasal Cannula 4.0 








Lab Results:





Results Past 24 Hours








Test


  2/22/18


06:28 Range/Units


 


 


White Blood Count 7.51 4.8-10.8  K/uL


 


Red Blood Count 3.94 4.2-5.4  M/uL


 


Hemoglobin 12.9 12.0-16.0  g/dL


 


Hematocrit 38.2 37-47  %


 


Mean Corpuscular Volume 97.0   fL


 


Mean Corpuscular Hemoglobin 32.7 25-34  pg


 


Mean Corpuscular Hemoglobin


Concent 33.8


  32-36  g/dl


 


 


RDW Standard Deviation 49.7 36.4-46.3  fL


 


RDW Coefficient of Variation 14.0 11.5-14.5  %


 


Platelet Count 160 130-400  K/uL


 


Mean Platelet Volume 9.8 7.4-10.4  fL


 


Sodium Level 139 136-145  mmol/L


 


Potassium Level 3.7 3.5-5.1  mmol/L


 


Chloride Level 106   mmol/L


 


Carbon Dioxide Level 27 21-32  mmol/L


 


Anion Gap 6.0 3-11  mmol/L


 


Blood Urea Nitrogen 11 7-18  mg/dl


 


Creatinine


  0.66


  0.60-1.20


mg/dl


 


Est Creatinine Clear Calc


Drug Dose 82.7


   ml/min


 


 


Estimated GFR (


American) 112.1


   


 


 


Estimated GFR (Non-


American 96.7


   


 


 


BUN/Creatinine Ratio 16.5 10-20  


 


Random Glucose 125 70-99  mg/dl


 


Calcium Level 7.9 8.5-10.1  mg/dl


 


Magnesium Level 2.1 1.8-2.4  mg/dl

## 2018-02-23 VITALS — OXYGEN SATURATION: 97 % | HEART RATE: 80 BPM

## 2018-02-23 VITALS
SYSTOLIC BLOOD PRESSURE: 149 MMHG | TEMPERATURE: 97.88 F | HEART RATE: 90 BPM | OXYGEN SATURATION: 91 % | DIASTOLIC BLOOD PRESSURE: 81 MMHG

## 2018-02-23 VITALS
OXYGEN SATURATION: 97 % | TEMPERATURE: 98.6 F | SYSTOLIC BLOOD PRESSURE: 113 MMHG | DIASTOLIC BLOOD PRESSURE: 75 MMHG | HEART RATE: 77 BPM

## 2018-02-23 VITALS — OXYGEN SATURATION: 97 % | HEART RATE: 77 BPM

## 2018-02-23 VITALS — OXYGEN SATURATION: 96 % | HEART RATE: 78 BPM

## 2018-02-23 VITALS
HEART RATE: 79 BPM | TEMPERATURE: 98.42 F | SYSTOLIC BLOOD PRESSURE: 107 MMHG | DIASTOLIC BLOOD PRESSURE: 69 MMHG | OXYGEN SATURATION: 97 %

## 2018-02-23 VITALS
HEART RATE: 78 BPM | SYSTOLIC BLOOD PRESSURE: 132 MMHG | OXYGEN SATURATION: 97 % | TEMPERATURE: 98.42 F | DIASTOLIC BLOOD PRESSURE: 85 MMHG

## 2018-02-23 VITALS — OXYGEN SATURATION: 80 % | HEART RATE: 89 BPM

## 2018-02-23 LAB
BUN SERPL-MCNC: 13 MG/DL (ref 7–18)
CALCIUM SERPL-MCNC: 9 MG/DL (ref 8.5–10.1)
CO2 SERPL-SCNC: 28 MMOL/L (ref 21–32)
CREAT SERPL-MCNC: 0.61 MG/DL (ref 0.6–1.2)
GLUCOSE SERPL-MCNC: 115 MG/DL (ref 70–99)
POTASSIUM SERPL-SCNC: 4.2 MMOL/L (ref 3.5–5.1)
SODIUM SERPL-SCNC: 140 MMOL/L (ref 136–145)

## 2018-02-23 RX ADMIN — GUAIFENESIN AND CODEINE PHOSPHATE PRN ML: 10; 100 LIQUID ORAL at 14:35

## 2018-02-23 RX ADMIN — NICOTINE SCH PATCH: 21 PATCH, EXTENDED RELEASE TRANSDERMAL at 09:04

## 2018-02-23 RX ADMIN — PANTOPRAZOLE SCH MG: 40 TABLET, DELAYED RELEASE ORAL at 09:04

## 2018-02-23 RX ADMIN — METHYLPREDNISOLONE SODIUM SUCCINATE SCH MLS/MIN: 1 INJECTION, POWDER, FOR SOLUTION INTRAMUSCULAR; INTRAVENOUS at 06:26

## 2018-02-23 RX ADMIN — LEVALBUTEROL SCH MG: 1.25 SOLUTION, CONCENTRATE RESPIRATORY (INHALATION) at 07:14

## 2018-02-23 RX ADMIN — GUAIFENESIN AND CODEINE PHOSPHATE PRN ML: 10; 100 LIQUID ORAL at 07:19

## 2018-02-23 RX ADMIN — METHYLPREDNISOLONE SODIUM SUCCINATE SCH MLS/MIN: 1 INJECTION, POWDER, FOR SOLUTION INTRAMUSCULAR; INTRAVENOUS at 14:36

## 2018-02-23 RX ADMIN — GUAIFENESIN AND CODEINE PHOSPHATE PRN ML: 10; 100 LIQUID ORAL at 22:05

## 2018-02-23 RX ADMIN — OSELTAMIVIR PHOSPHATE SCH MG: 75 CAPSULE ORAL at 08:00

## 2018-02-23 RX ADMIN — OSELTAMIVIR PHOSPHATE SCH MG: 75 CAPSULE ORAL at 20:00

## 2018-02-23 RX ADMIN — IPRATROPIUM BROMIDE SCH MG: 0.5 SOLUTION RESPIRATORY (INHALATION) at 14:22

## 2018-02-23 RX ADMIN — AZITHROMYCIN MONOHYDRATE SCH MLS/HR: 500 INJECTION, POWDER, LYOPHILIZED, FOR SOLUTION INTRAVENOUS at 16:52

## 2018-02-23 RX ADMIN — IPRATROPIUM BROMIDE SCH MG: 0.5 SOLUTION RESPIRATORY (INHALATION) at 02:06

## 2018-02-23 RX ADMIN — IPRATROPIUM BROMIDE SCH MG: 0.5 SOLUTION RESPIRATORY (INHALATION) at 07:14

## 2018-02-23 RX ADMIN — LEVALBUTEROL SCH MG: 1.25 SOLUTION, CONCENTRATE RESPIRATORY (INHALATION) at 19:40

## 2018-02-23 RX ADMIN — CEFTRIAXONE SCH MLS/HR: 1 INJECTION, POWDER, FOR SOLUTION INTRAMUSCULAR; INTRAVENOUS at 14:47

## 2018-02-23 RX ADMIN — LEVALBUTEROL SCH MG: 1.25 SOLUTION, CONCENTRATE RESPIRATORY (INHALATION) at 14:22

## 2018-02-23 RX ADMIN — METHYLPREDNISOLONE SODIUM SUCCINATE SCH MLS/MIN: 1 INJECTION, POWDER, FOR SOLUTION INTRAMUSCULAR; INTRAVENOUS at 22:05

## 2018-02-23 RX ADMIN — LEVALBUTEROL SCH MG: 1.25 SOLUTION, CONCENTRATE RESPIRATORY (INHALATION) at 02:06

## 2018-02-23 RX ADMIN — ACETAMINOPHEN PRN MG: 325 TABLET ORAL at 14:36

## 2018-02-23 RX ADMIN — IPRATROPIUM BROMIDE SCH MG: 0.5 SOLUTION RESPIRATORY (INHALATION) at 19:40

## 2018-02-23 RX ADMIN — CITALOPRAM HYDROBROMIDE SCH MG: 40 TABLET ORAL at 09:04

## 2018-02-23 RX ADMIN — ACETAMINOPHEN PRN MG: 325 TABLET ORAL at 22:06

## 2018-02-23 NOTE — PROGRESS NOTE
Internal Med Progress Note


Date of Service:


Feb 23, 2018.


Provider Documentation:





SUBJECTIVE:





The patient was seen and examined 


Admitted with Flu and Pneumonia 


Clinically a little better today 








OBJECTIVE:





Vital Signs-as noted below





Exam:


General-Moderate distress at rest


Eyes-Normal


ENT-Normal


Neck-Supple


Lungs-Decreased breaths sound bilaterally 


Minimal wheezing 


Heart-Regular. no murmur appreciated 


Abdomen-Benign,no masses,bowel sound present 


Extremities-Trace edema bilaterally  


Neuro-AAOx3





Lab data as noted below.


ASSESSMENT & PLAN:





Pt is 56 y/o F with PMH COPD, nocturnal hypoxemia, depression, GERD, tobacco 

use presented to ER with c/o fever, increased SOB and cough. Pt reports 2 days 

ago started with myalgias, cough, increased SOB and tactile fever. Uses oxygen @

2L HS chronically.


Negative POC lactic acid. Troponin negative. CXR: Mild right lower lung 

reticulonodular interstitial thickening which may reflect a mild infectious 

process such as bronchiolitis.





HYPOXIA SECONDARY TO INFLUENZA AND COPD EXACERBATION


POSSIBLE PNEUMONIA 


Started on Azithromycin,Ceftriaxone , DuoNeb, and Solumedrol and Tamiflu


Supplemental O2, continue HS O2


Continue home inhalers


Remains stable ,minimal improvement 


Increase ambulation





GERD


Continue PPI





DEPRESSION/ANXIETY


Continue Celexa 





TOBACCO ABUSE


Smoking cessation discussed


Nicotine patch





DVT Prophylaxis


-SCDs





Disposition


admit med/surg


Full Code


Follows with Dr Mc for routine care


Vital Signs:











  Date Time  Temp Pulse Resp B/P (MAP) Pulse Ox O2 Delivery O2 Flow Rate FiO2


 


2/23/18 16:05 36.9 79 18 107/69 (82) 97 Nasal Cannula 2.5 


 


2/23/18 14:23  77 18  97 Nasal Cannula 3.0 


 


2/23/18 08:00      Nasal Cannula 3.0 


 


2/23/18 07:54 36.6 90 22 149/81 (103) 91 Nasal Cannula 3.0 


 


2/23/18 07:14  78 18  96 Nasal Cannula 3.0 


 


2/23/18 02:06  89 18  80 Room Air  


 


2/23/18 00:20 37.0 77 20 113/75 (88) 97 Nasal Cannula 3.0 


 


2/23/18 00:05      Nasal Cannula 2.0 


 


2/22/18 19:29  95 18  95 Nasal Cannula 3.0 








Lab Results:





Results Past 24 Hours








Test


  2/23/18


07:25 Range/Units


 


 


Sodium Level 140 136-145  mmol/L


 


Potassium Level 4.2 3.5-5.1  mmol/L


 


Chloride Level 106   mmol/L


 


Carbon Dioxide Level 28 21-32  mmol/L


 


Anion Gap 6.0 3-11  mmol/L


 


Blood Urea Nitrogen 13 7-18  mg/dl


 


Creatinine


  0.61


  0.60-1.20


mg/dl


 


Est Creatinine Clear Calc


Drug Dose 89.4


   ml/min


 


 


Estimated GFR (


American) 115.0


   


 


 


Estimated GFR (Non-


American 99.2


   


 


 


BUN/Creatinine Ratio 21.8 10-20  


 


Random Glucose 115 70-99  mg/dl


 


Calcium Level 9.0 8.5-10.1  mg/dl


 


Magnesium Level 2.5 1.8-2.4  mg/dl

## 2018-02-24 VITALS — OXYGEN SATURATION: 96 % | HEART RATE: 84 BPM

## 2018-02-24 VITALS
HEART RATE: 89 BPM | OXYGEN SATURATION: 94 % | SYSTOLIC BLOOD PRESSURE: 147 MMHG | TEMPERATURE: 98.24 F | DIASTOLIC BLOOD PRESSURE: 86 MMHG

## 2018-02-24 VITALS
DIASTOLIC BLOOD PRESSURE: 82 MMHG | SYSTOLIC BLOOD PRESSURE: 128 MMHG | TEMPERATURE: 98.42 F | OXYGEN SATURATION: 96 % | HEART RATE: 75 BPM

## 2018-02-24 VITALS — HEART RATE: 85 BPM | OXYGEN SATURATION: 96 %

## 2018-02-24 VITALS — OXYGEN SATURATION: 97 % | HEART RATE: 72 BPM

## 2018-02-24 VITALS — HEART RATE: 74 BPM | OXYGEN SATURATION: 97 %

## 2018-02-24 VITALS — OXYGEN SATURATION: 96 % | HEART RATE: 85 BPM

## 2018-02-24 LAB
BUN SERPL-MCNC: 15 MG/DL (ref 7–18)
CALCIUM SERPL-MCNC: 8.7 MG/DL (ref 8.5–10.1)
CO2 SERPL-SCNC: 25 MMOL/L (ref 21–32)
CREAT SERPL-MCNC: 0.55 MG/DL (ref 0.6–1.2)
GLUCOSE SERPL-MCNC: 114 MG/DL (ref 70–99)
POTASSIUM SERPL-SCNC: 3.8 MMOL/L (ref 3.5–5.1)
SODIUM SERPL-SCNC: 136 MMOL/L (ref 136–145)

## 2018-02-24 RX ADMIN — GUAIFENESIN AND CODEINE PHOSPHATE PRN ML: 10; 100 LIQUID ORAL at 06:23

## 2018-02-24 RX ADMIN — OSELTAMIVIR PHOSPHATE SCH MG: 75 CAPSULE ORAL at 08:00

## 2018-02-24 RX ADMIN — IPRATROPIUM BROMIDE SCH MG: 0.5 SOLUTION RESPIRATORY (INHALATION) at 23:22

## 2018-02-24 RX ADMIN — LEVALBUTEROL SCH MG: 1.25 SOLUTION, CONCENTRATE RESPIRATORY (INHALATION) at 19:08

## 2018-02-24 RX ADMIN — CITALOPRAM HYDROBROMIDE SCH MG: 40 TABLET ORAL at 08:04

## 2018-02-24 RX ADMIN — LEVALBUTEROL SCH MG: 1.25 SOLUTION, CONCENTRATE RESPIRATORY (INHALATION) at 01:44

## 2018-02-24 RX ADMIN — CEFTRIAXONE SCH MLS/HR: 1 INJECTION, POWDER, FOR SOLUTION INTRAMUSCULAR; INTRAVENOUS at 14:26

## 2018-02-24 RX ADMIN — LEVALBUTEROL SCH MG: 1.25 SOLUTION, CONCENTRATE RESPIRATORY (INHALATION) at 08:05

## 2018-02-24 RX ADMIN — GUAIFENESIN AND CODEINE PHOSPHATE PRN ML: 10; 100 LIQUID ORAL at 22:00

## 2018-02-24 RX ADMIN — IPRATROPIUM BROMIDE SCH MG: 0.5 SOLUTION RESPIRATORY (INHALATION) at 01:44

## 2018-02-24 RX ADMIN — NICOTINE SCH PATCH: 21 PATCH, EXTENDED RELEASE TRANSDERMAL at 08:05

## 2018-02-24 RX ADMIN — PANTOPRAZOLE SCH MG: 40 TABLET, DELAYED RELEASE ORAL at 08:04

## 2018-02-24 RX ADMIN — IPRATROPIUM BROMIDE SCH MG: 0.5 SOLUTION RESPIRATORY (INHALATION) at 08:05

## 2018-02-24 RX ADMIN — METHYLPREDNISOLONE SODIUM SUCCINATE SCH MLS/MIN: 1 INJECTION, POWDER, FOR SOLUTION INTRAMUSCULAR; INTRAVENOUS at 06:28

## 2018-02-24 RX ADMIN — LEVALBUTEROL SCH MG: 1.25 SOLUTION, CONCENTRATE RESPIRATORY (INHALATION) at 23:22

## 2018-02-24 RX ADMIN — IPRATROPIUM BROMIDE SCH MG: 0.5 SOLUTION RESPIRATORY (INHALATION) at 19:08

## 2018-02-24 RX ADMIN — AZITHROMYCIN MONOHYDRATE SCH MLS/HR: 500 INJECTION, POWDER, LYOPHILIZED, FOR SOLUTION INTRAVENOUS at 15:20

## 2018-02-24 RX ADMIN — IPRATROPIUM BROMIDE SCH MG: 0.5 SOLUTION RESPIRATORY (INHALATION) at 14:32

## 2018-02-24 RX ADMIN — LEVALBUTEROL SCH MG: 1.25 SOLUTION, CONCENTRATE RESPIRATORY (INHALATION) at 14:32

## 2018-02-24 RX ADMIN — OSELTAMIVIR PHOSPHATE SCH MG: 75 CAPSULE ORAL at 19:35

## 2018-02-24 NOTE — PROGRESS NOTE
Internal Med Progress Note


Date of Service:


Feb 24, 2018.


Provider Documentation:





SUBJECTIVE:





The patient was seen and examined 


Admitted with Flu and Pneumonia 


Clinically much better 


Could not sleep last night








OBJECTIVE:





Vital Signs-as noted below





Exam:


General-Moderate distress at rest


Eyes-Normal


ENT-Normal


Neck-Supple


Lungs-Decreased breaths sound bilaterally 


Minimal wheezing but slight improvement in breath sound


Heart-Regular. no murmur appreciated 


Abdomen-Benign,no masses,bowel sound present 


Extremities-Trace edema bilaterally  


Neuro-AAOx3





Lab data as noted below.


ASSESSMENT & PLAN:





Pt is 56 y/o F with PMH COPD, nocturnal hypoxemia, depression, GERD, tobacco 

use presented to ER with c/o fever, increased SOB and cough. Pt reports 2 days 

ago started with myalgias, cough, increased SOB and tactile fever. Uses oxygen @

2L HS chronically.


Negative POC lactic acid. Troponin negative. CXR: Mild right lower lung 

reticulonodular interstitial thickening which may reflect a mild infectious 

process such as bronchiolitis.





HYPOXIA SECONDARY TO INFLUENZA AND COPD EXACERBATION


POSSIBLE PNEUMONIA 


Started on Azithromycin,Ceftriaxone , DuoNeb, and Solumedrol and Tamiflu


Supplemental O2, continue HS O2


Continue home inhalers


Remains stable ,minimal improvement 


Increase ambulation


Clinically better 


Solumedrol changed to Orap Prednisone from 2/25





GERD


Continue PPI





DEPRESSION/ANXIETY


Continue Celexa 





TOBACCO ABUSE


Smoking cessation discussed


Nicotine patch





DVT Prophylaxis


-SCDs





Disposition


admit med/surg


Full Code


Follows with Dr Mc for routine care


Vital Signs:











  Date Time  Temp Pulse Resp B/P (MAP) Pulse Ox O2 Delivery O2 Flow Rate FiO2


 


2/24/18 08:10  72 18  97 Nasal Cannula 2.0 


 


2/24/18 08:00      Room Air 2.0 





      Nasal Cannula  


 


2/24/18 07:38 36.9 75 18 128/82 (97) 96 Nasal Cannula 3.0 


 


2/24/18 01:44  74 18  97 Nasal Cannula 3.0 


 


2/24/18 00:05      Nasal Cannula 3.0 


 


2/23/18 23:56 36.9 78 20 132/85 (101) 97 Nasal Cannula 3.0 


 


2/23/18 19:40  80 18  97 Nasal Cannula 3.0 


 


2/23/18 16:05 36.9 79 18 107/69 (82) 97 Nasal Cannula 2.5 


 


2/23/18 16:00      Nasal Cannula 3.0 


 


2/23/18 14:23  77 18  97 Nasal Cannula 3.0 








Lab Results:





Results Past 24 Hours








Test


  2/24/18


06:23 Range/Units


 


 


Sodium Level 136 136-145  mmol/L


 


Potassium Level 3.8 3.5-5.1  mmol/L


 


Chloride Level 103   mmol/L


 


Carbon Dioxide Level 25 21-32  mmol/L


 


Anion Gap 8.0 3-11  mmol/L


 


Blood Urea Nitrogen 15 7-18  mg/dl


 


Creatinine


  0.55


  0.60-1.20


mg/dl


 


Est Creatinine Clear Calc


Drug Dose 99.2


   ml/min


 


 


Estimated GFR (


American) 119.0


   


 


 


Estimated GFR (Non-


American 102.7


   


 


 


BUN/Creatinine Ratio 27.2 10-20  


 


Random Glucose 114 70-99  mg/dl


 


Calcium Level 8.7 8.5-10.1  mg/dl


 


Magnesium Level 2.4 1.8-2.4  mg/dl

## 2018-02-25 VITALS
SYSTOLIC BLOOD PRESSURE: 132 MMHG | HEART RATE: 85 BPM | TEMPERATURE: 98.24 F | DIASTOLIC BLOOD PRESSURE: 75 MMHG | OXYGEN SATURATION: 93 %

## 2018-02-25 VITALS
HEART RATE: 74 BPM | TEMPERATURE: 98.42 F | SYSTOLIC BLOOD PRESSURE: 122 MMHG | OXYGEN SATURATION: 90 % | DIASTOLIC BLOOD PRESSURE: 77 MMHG

## 2018-02-25 VITALS — HEART RATE: 77 BPM | OXYGEN SATURATION: 96 %

## 2018-02-25 VITALS — HEART RATE: 66 BPM | OXYGEN SATURATION: 94 %

## 2018-02-25 VITALS — HEART RATE: 74 BPM | OXYGEN SATURATION: 94 %

## 2018-02-25 VITALS — OXYGEN SATURATION: 94 % | HEART RATE: 74 BPM

## 2018-02-25 VITALS
HEART RATE: 85 BPM | OXYGEN SATURATION: 96 % | DIASTOLIC BLOOD PRESSURE: 94 MMHG | SYSTOLIC BLOOD PRESSURE: 149 MMHG | TEMPERATURE: 98.24 F

## 2018-02-25 LAB
BUN SERPL-MCNC: 18 MG/DL (ref 7–18)
CALCIUM SERPL-MCNC: 8.8 MG/DL (ref 8.5–10.1)
CO2 SERPL-SCNC: 31 MMOL/L (ref 21–32)
CREAT SERPL-MCNC: 0.67 MG/DL (ref 0.6–1.2)
GLUCOSE SERPL-MCNC: 81 MG/DL (ref 70–99)
POTASSIUM SERPL-SCNC: 3.2 MMOL/L (ref 3.5–5.1)
SODIUM SERPL-SCNC: 139 MMOL/L (ref 136–145)

## 2018-02-25 RX ADMIN — LEVALBUTEROL SCH MG: 1.25 SOLUTION, CONCENTRATE RESPIRATORY (INHALATION) at 07:35

## 2018-02-25 RX ADMIN — IPRATROPIUM BROMIDE SCH MG: 0.5 SOLUTION RESPIRATORY (INHALATION) at 19:44

## 2018-02-25 RX ADMIN — LEVALBUTEROL SCH MG: 1.25 SOLUTION, CONCENTRATE RESPIRATORY (INHALATION) at 03:00

## 2018-02-25 RX ADMIN — IPRATROPIUM BROMIDE SCH MG: 0.5 SOLUTION RESPIRATORY (INHALATION) at 14:22

## 2018-02-25 RX ADMIN — PANTOPRAZOLE SCH MG: 40 TABLET, DELAYED RELEASE ORAL at 07:58

## 2018-02-25 RX ADMIN — LEVALBUTEROL SCH MG: 1.25 SOLUTION, CONCENTRATE RESPIRATORY (INHALATION) at 23:06

## 2018-02-25 RX ADMIN — IPRATROPIUM BROMIDE SCH MG: 0.5 SOLUTION RESPIRATORY (INHALATION) at 07:35

## 2018-02-25 RX ADMIN — OSELTAMIVIR PHOSPHATE SCH MG: 75 CAPSULE ORAL at 19:52

## 2018-02-25 RX ADMIN — LEVALBUTEROL SCH MG: 1.25 SOLUTION, CONCENTRATE RESPIRATORY (INHALATION) at 14:22

## 2018-02-25 RX ADMIN — IPRATROPIUM BROMIDE SCH MG: 0.5 SOLUTION RESPIRATORY (INHALATION) at 03:00

## 2018-02-25 RX ADMIN — AZITHROMYCIN MONOHYDRATE SCH MLS/HR: 500 INJECTION, POWDER, LYOPHILIZED, FOR SOLUTION INTRAVENOUS at 14:50

## 2018-02-25 RX ADMIN — OSELTAMIVIR PHOSPHATE SCH MG: 75 CAPSULE ORAL at 07:54

## 2018-02-25 RX ADMIN — NICOTINE SCH PATCH: 21 PATCH, EXTENDED RELEASE TRANSDERMAL at 07:58

## 2018-02-25 RX ADMIN — CEFTRIAXONE SCH MLS/HR: 1 INJECTION, POWDER, FOR SOLUTION INTRAMUSCULAR; INTRAVENOUS at 13:50

## 2018-02-25 RX ADMIN — CITALOPRAM HYDROBROMIDE SCH MG: 40 TABLET ORAL at 07:58

## 2018-02-25 RX ADMIN — IPRATROPIUM BROMIDE SCH MG: 0.5 SOLUTION RESPIRATORY (INHALATION) at 23:06

## 2018-02-25 RX ADMIN — LEVALBUTEROL SCH MG: 1.25 SOLUTION, CONCENTRATE RESPIRATORY (INHALATION) at 19:44

## 2018-02-25 NOTE — PROGRESS NOTE
Internal Med Progress Note


Date of Service:


Feb 25, 2018.


Provider Documentation:





SUBJECTIVE:





The patient was seen and examined 


Admitted with Flu and Pneumonia 


Clinically much better 


Slept well last night











OBJECTIVE:





Vital Signs-as noted below





Exam:


General-Moderate distress at rest


Eyes-Normal


ENT-Normal


Neck-Supple


Lungs-Decreased breaths sound bilaterally -improves a lot 


Minimal wheezing but slight improvement in breath sound


Heart-Regular. no murmur appreciated 


Abdomen-Benign,no masses,bowel sound present 


Extremities-Trace edema bilaterally  


Neuro-AAOx3





Lab data as noted below.


ASSESSMENT & PLAN:





Pt is 58 y/o F with PMH COPD, nocturnal hypoxemia, depression, GERD, tobacco 

use presented to ER with c/o fever, increased SOB and cough. Pt reports 2 days 

ago started with myalgias, cough, increased SOB and tactile fever. Uses oxygen @

2L HS chronically.


Negative POC lactic acid. Troponin negative. CXR: Mild right lower lung 

reticulonodular interstitial thickening which may reflect a mild infectious 

process such as bronchiolitis.





HYPOXIA SECONDARY TO INFLUENZA AND COPD EXACERBATION


POSSIBLE PNEUMONIA 


Started on Azithromycin,Ceftriaxone , DuoNeb, and Solumedrol and Tamiflu


Supplemental O2, continue HS O2


Continue home inhalers


Remains stable ,minimal improvement 


Increase ambulation


Clinically better 


Solumedrol changed to Orap Prednisone from 2/25


Much better today 


Increase ambulation 





GERD


Continue PPI





DEPRESSION/ANXIETY


Continue Celexa 





TOBACCO ABUSE


Smoking cessation discussed


Nicotine patch





DVT Prophylaxis


-SCDs





Disposition


admit med/surg


Full Code


Follows with Dr Mc for routine care


Likely discharge to 


[place]


Vital Signs:











  Date Time  Temp Pulse Resp B/P (MAP) Pulse Ox O2 Delivery O2 Flow Rate FiO2


 


2/25/18 08:00      Room Air 2.0 





      Nasal Cannula  


 


2/25/18 07:39  66 16  94 Nasal Cannula 2.0 


 


2/25/18 07:37 36.9 74 20 122/77 (92) 90 Room Air  


 


2/25/18 00:20 36.8 85 18 149/94 (112) 96 Nasal Cannula 2.0 


 


2/25/18 00:05      Nasal Cannula 3.0 


 


2/24/18 23:22  85 18  96 Nasal Cannula 2.0 


 


2/24/18 20:05      Nasal Cannula 3.0 


 


2/24/18 19:08  85 18  96 Nasal Cannula 2.0 


 


2/24/18 16:00      Room Air 2.0 





      Nasal Cannula  


 


2/24/18 15:58 36.8 89 18 147/86 (106) 94 Nasal Cannula 2.0 


 


2/24/18 14:32  84 18  96 Nasal Cannula 2.0 








Lab Results:





Results Past 24 Hours








Test


  2/25/18


08:26 Range/Units


 


 


Sodium Level 139 136-145  mmol/L


 


Potassium Level 3.2 3.5-5.1  mmol/L


 


Chloride Level 102   mmol/L


 


Carbon Dioxide Level 31 21-32  mmol/L


 


Anion Gap 6.0 3-11  mmol/L


 


Blood Urea Nitrogen 18 7-18  mg/dl


 


Creatinine


  0.67


  0.60-1.20


mg/dl


 


Est Creatinine Clear Calc


Drug Dose 81.4


   ml/min


 


 


Estimated GFR (


American) 111.5


   


 


 


Estimated GFR (Non-


American 96.2


   


 


 


BUN/Creatinine Ratio 27.1 10-20  


 


Random Glucose 81 70-99  mg/dl


 


Calcium Level 8.8 8.5-10.1  mg/dl


 


Magnesium Level 2.3 1.8-2.4  mg/dl

## 2018-02-26 VITALS
OXYGEN SATURATION: 97 % | DIASTOLIC BLOOD PRESSURE: 90 MMHG | TEMPERATURE: 98.24 F | HEART RATE: 84 BPM | SYSTOLIC BLOOD PRESSURE: 141 MMHG

## 2018-02-26 VITALS
HEART RATE: 68 BPM | DIASTOLIC BLOOD PRESSURE: 92 MMHG | TEMPERATURE: 98.24 F | SYSTOLIC BLOOD PRESSURE: 145 MMHG | OXYGEN SATURATION: 94 %

## 2018-02-26 VITALS — OXYGEN SATURATION: 97 % | HEART RATE: 66 BPM

## 2018-02-26 VITALS — OXYGEN SATURATION: 95 % | HEART RATE: 77 BPM

## 2018-02-26 VITALS — HEART RATE: 82 BPM | OXYGEN SATURATION: 97 %

## 2018-02-26 VITALS
TEMPERATURE: 98.24 F | HEART RATE: 68 BPM | SYSTOLIC BLOOD PRESSURE: 145 MMHG | DIASTOLIC BLOOD PRESSURE: 92 MMHG | OXYGEN SATURATION: 94 %

## 2018-02-26 LAB
BUN SERPL-MCNC: 15 MG/DL (ref 7–18)
CALCIUM SERPL-MCNC: 8.5 MG/DL (ref 8.5–10.1)
CO2 SERPL-SCNC: 28 MMOL/L (ref 21–32)
CREAT SERPL-MCNC: 0.62 MG/DL (ref 0.6–1.2)
EOSINOPHIL NFR BLD AUTO: 174 K/UL (ref 130–400)
GLUCOSE SERPL-MCNC: 81 MG/DL (ref 70–99)
HCT VFR BLD CALC: 40.5 % (ref 37–47)
HGB BLD-MCNC: 14.1 G/DL (ref 12–16)
MCH RBC QN AUTO: 32.9 PG (ref 25–34)
MCHC RBC AUTO-ENTMCNC: 34.8 G/DL (ref 32–36)
MCV RBC AUTO: 94.6 FL (ref 80–100)
PHOSPHATE SERPL-MCNC: 3.2 MG/DL (ref 2.5–4.9)
PMV BLD AUTO: 9.7 FL (ref 7.4–10.4)
POTASSIUM SERPL-SCNC: 3.5 MMOL/L (ref 3.5–5.1)
RED CELL DISTRIBUTION WIDTH CV: 13.6 % (ref 11.5–14.5)
RED CELL DISTRIBUTION WIDTH SD: 46.7 FL (ref 36.4–46.3)
SODIUM SERPL-SCNC: 139 MMOL/L (ref 136–145)
WBC # BLD AUTO: 6.33 K/UL (ref 4.8–10.8)

## 2018-02-26 RX ADMIN — CITALOPRAM HYDROBROMIDE SCH MG: 40 TABLET ORAL at 08:27

## 2018-02-26 RX ADMIN — OSELTAMIVIR PHOSPHATE SCH MG: 75 CAPSULE ORAL at 08:00

## 2018-02-26 RX ADMIN — NICOTINE SCH PATCH: 21 PATCH, EXTENDED RELEASE TRANSDERMAL at 08:28

## 2018-02-26 RX ADMIN — PANTOPRAZOLE SCH MG: 40 TABLET, DELAYED RELEASE ORAL at 08:27

## 2018-02-26 RX ADMIN — LEVALBUTEROL SCH MG: 1.25 SOLUTION, CONCENTRATE RESPIRATORY (INHALATION) at 03:24

## 2018-02-26 RX ADMIN — LEVALBUTEROL SCH MG: 1.25 SOLUTION, CONCENTRATE RESPIRATORY (INHALATION) at 14:19

## 2018-02-26 RX ADMIN — IPRATROPIUM BROMIDE SCH MG: 0.5 SOLUTION RESPIRATORY (INHALATION) at 07:21

## 2018-02-26 RX ADMIN — CEFTRIAXONE SCH MLS/HR: 1 INJECTION, POWDER, FOR SOLUTION INTRAMUSCULAR; INTRAVENOUS at 14:00

## 2018-02-26 RX ADMIN — LEVALBUTEROL SCH MG: 1.25 SOLUTION, CONCENTRATE RESPIRATORY (INHALATION) at 07:21

## 2018-02-26 RX ADMIN — IPRATROPIUM BROMIDE SCH MG: 0.5 SOLUTION RESPIRATORY (INHALATION) at 14:19

## 2018-02-26 RX ADMIN — IPRATROPIUM BROMIDE SCH MG: 0.5 SOLUTION RESPIRATORY (INHALATION) at 03:24

## 2018-02-26 NOTE — PROGRESS NOTE
Internal Med Progress Note


Date of Service:


Feb 26, 2018.


Provider Documentation:





SUBJECTIVE:





The patient was seen and examined 


Admitted with Flu and Pneumonia 


Clinically much better 


Slept well last night


Much better today Ambulating well and 


Ready to be discharged 











OBJECTIVE:





Vital Signs-as noted below





Exam:


General-Moderate distress at rest


Eyes-Normal


ENT-Normal


Neck-Supple


Lungs-Decreased breaths sound bilaterally -improves a lot 


Minimal wheezing but slight improvement in breath sound


Heart-Regular. no murmur appreciated 


Abdomen-Benign,no masses,bowel sound present 


Extremities-Trace edema bilaterally  


Neuro-AAOx3





Lab data as noted below.


ASSESSMENT & PLAN:





Pt is 58 y/o F with PMH COPD, nocturnal hypoxemia, depression, GERD, tobacco 

use presented to ER with c/o fever, increased SOB and cough. Pt reports 2 days 

ago started with myalgias, cough, increased SOB and tactile fever. Uses oxygen @

2L HS chronically.


Negative POC lactic acid. Troponin negative. CXR: Mild right lower lung 

reticulonodular interstitial thickening which may reflect a mild infectious 

process such as bronchiolitis.





HYPOXIA SECONDARY TO INFLUENZA AND COPD EXACERBATION


POSSIBLE PNEUMONIA 


Started on Azithromycin,Ceftriaxone , DuoNeb, and Solumedrol and Tamiflu


Supplemental O2, continue HS O2


Continue home inhalers


Remains stable ,minimal improvement 


Increase ambulation


Clinically better 


Solumedrol changed to Orap Prednisone from 2/25


Much better today 


Increase ambulation -doing better without any desaturation 


Has Nighttime O2 at home 





GERD


Continue PPI





DEPRESSION/ANXIETY


Continue Celexa 





TOBACCO ABUSE


Smoking cessation discussed


Nicotine patch





DVT Prophylaxis


-SCDs





Disposition


admit med/surg


Full Code


Follows with Dr Mc for routine care


Likely discharge today


Vital Signs:











  Date Time  Temp Pulse Resp B/P (MAP) Pulse Ox O2 Delivery O2 Flow Rate FiO2


 


2/26/18 08:00      Room Air 2.0 





      Nasal Cannula  


 


2/26/18 07:30 36.8 68 18 145/92 (109) 94 Nasal Cannula 2.0 


 


2/26/18 07:21  66 16  97 Nasal Cannula 2.0 


 


2/26/18 03:24  77 16  95 Room Air  


 


2/26/18 00:00 36.8 84 18 141/90 (107) 97 Nasal Cannula 2.0 


 


2/25/18 23:07  74 16  94 Room Air  


 


2/25/18 20:05      Nasal Cannula 2.0 


 


2/25/18 19:45  74 16  94 Room Air  


 


2/25/18 16:00      Room Air 2.0 





      Nasal Cannula  


 


2/25/18 15:28 36.8 85 18 132/75 (94) 93 Room Air  


 


2/25/18 14:23  77 16  96 Nasal Cannula 2.0 








Lab Results:





Results Past 24 Hours








Test


  2/26/18


06:34 Range/Units


 


 


White Blood Count 6.33 4.8-10.8  K/uL


 


Red Blood Count 4.28 4.2-5.4  M/uL


 


Hemoglobin 14.1 12.0-16.0  g/dL


 


Hematocrit 40.5 37-47  %


 


Mean Corpuscular Volume 94.6   fL


 


Mean Corpuscular Hemoglobin 32.9 25-34  pg


 


Mean Corpuscular Hemoglobin


Concent 34.8


  32-36  g/dl


 


 


RDW Standard Deviation 46.7 36.4-46.3  fL


 


RDW Coefficient of Variation 13.6 11.5-14.5  %


 


Platelet Count 174 130-400  K/uL


 


Mean Platelet Volume 9.7 7.4-10.4  fL


 


Sodium Level 139 136-145  mmol/L


 


Potassium Level 3.5 3.5-5.1  mmol/L


 


Chloride Level 104   mmol/L


 


Carbon Dioxide Level 28 21-32  mmol/L


 


Anion Gap 7.0 3-11  mmol/L


 


Blood Urea Nitrogen 15 7-18  mg/dl


 


Creatinine


  0.62


  0.60-1.20


mg/dl


 


Est Creatinine Clear Calc


Drug Dose 88.0


   ml/min


 


 


Estimated GFR (


American) 114.4


   


 


 


Estimated GFR (Non-


American 98.7


   


 


 


BUN/Creatinine Ratio 24.9 10-20  


 


Random Glucose 81 70-99  mg/dl


 


Calcium Level 8.5 8.5-10.1  mg/dl


 


Phosphorus Level 3.2 2.5-4.9  mg/dl


 


Magnesium Level 2.3 1.8-2.4  mg/dl


 


Chemistry Specimen Hemolysis

## 2018-02-26 NOTE — DISCHARGE INSTRUCTIONS
Discharge Instructions


Date of Service


Feb 26, 2018.





Admission


Reason for Admission:  Influenza A





Discharge


Discharge Diagnosis / Problem:  COPD exacerbation,Influenza A





Discharge Goals


Goal(s):  Prevent Disease Progression





Activity Recommendations


Activity Limitations:  resume your previous activity





.





Instructions / Follow-Up


Instructions / Follow-Up


Dr Sim on 03/012/18 at 12:45 PM ( Dr Mc is away)





Current Hospital Diet


Patient's current hospital diet: AHA Diet (Heart Healthy)





Discharge Diet


Recommended Diet:  AHA Diet (Heart Healthy)





Pending Studies


Studies pending at discharge:  no





Medical Emergencies








.


Who to Call and When:





Medical Emergencies:  If at any time you feel your situation is an emergency, 

please call 911 immediately.





.





Non-Emergent Contact


Non-Emergency issues call your:  Primary Care Provider





.





Past History


Medical & Surgical History:  


(1) COPD (chronic obstructive pulmonary disease)


(2) COPD exacerbation


(3) HTN (hypertension)


(4) Influenza A


(5) GERD (gastroesophageal reflux disease)


(6) No pertinent past surgical history


(7) Active smoker


.








"Provider Documentation" section prepared by Alivia Deluca.








.





VTE Core Measure


Inpt VTE Proph given/why not?:  SCD's

## 2018-02-27 NOTE — DISCHARGE SUMMARY
Discharge Summary


Date of Service


Feb 27, 2018.





Discharge Summary


Admission Date:


Feb 21, 2018 at 15:50


Discharge Date:  Feb 26, 2018


Principal Diagnosis:


COPD exacerbation,Influenza A


Secondary Diagnoses/Problems:


Please see H&P and Hospital Progress note





Medication Reconciliation


New Medications:  


Prednisone Tab (Prednisone) 10 Mg Tab


10 MG PO UD for 12 Days, #30 TAB


4 po daily for 3 dasy,3 po daily for 3 dasy,2 po daily for 3 days and


 the 1 po daily for 3 days


Nicotine (Nicoderm Cq) 21 Mg/24 Hr Dis


1 PATCH TD QAM for 30 Days, #30





 


Continued Medications:  


Albuterol Hfa (Ventolin Hfa) 200 Puffs/22249 Mcg Aers


2 PUFFS INH Q4H for SOB/Wheezing, #1 INHALER





Albuterol Sulfate (Proair Respiclick) 108 Mcg/Act Aer


2 PUFFS INH QID PRN for SOB/Wheezing





Citalopram (Citalopram Hydrobromide) 40 Mg Tab


40 MG PO DAILY





Fluticasone Furoate-Vilanterol (Breo Ellipta) 1 Inh Inh


1 PUFF INH QAM





Ibuprofen (Advil) 200 Mg Tab


400-600 MG PO Q6H PRN for Pain, TAB





Ipratropium-Albuterol (Duoneb) 3 Ml Nebu


1 TREATMENT INH Q6H PRN for SOB/Wheezing, INHA





Omeprazole (Omeprazole) 20 Mg Tab


1 TAB PO DAILY for 90 Days, #90 TAB 1 Refill





Umeclidinium Bromide (Incruse Ellipta) 62.5 Mcg/Inh Inh


1 PUFF INH DAILY for PRN











Admission Information


HPI (per Admitting provider):


Pt is 56 y/o F with PMH COPD, nocturnal hypemia, depression, GERD, tobacco use 

presented to ER with c/o fever, increased SOB and cough. Pt reports 2 days ago 

started with myalgias, cough, increased SOB and tactile fever. Uses oxygen @2L 

HS chronically. Reports past couple of days has been checking pulse ox at home 

and sat's 86% and she has been using her oxygen throughout the day. Worsening 

symptoms today prompted ER visit. Reports granddaughter with URI symptoms 

recently. Pt states 2 days ago had couple episodes of vomiting and one of 

diarrhea which has since resolved. Denies HA, dizziness, syncope, vision changes

, neck pain, CP, orthopnea, palpitations,hemoptysis, choking, otalgia, 

abdominal pain, paresthesias, extremity edema, rashes, urinary symptoms.








Past Medical/Surgical History


Medical Problems:


(1) COPD (chronic obstructive pulmonary disease)


Status: Chronic  





(2) Depression


Status: Chronic  





(3) Fibromyalgia


Status: Chronic  





(4) GERD (gastroesophageal reflux disease)


Status: Chronic  





(5) HLD (hyperlipidemia)


Status: Chronic  





(6) HTN (hypertension)


Status: Chronic  





Surgical Problems:


(1) Hx of blepharoplasty


Status: Resolved  





(2) No pertinent past surgical history


Status: Chronic  





Social History Problems:


(1) Active smoker


Status: Chronic  








Family History





Depression


Diabetes mellitus





Social History


Smoking Status:  Current Every Day Smoker (1-1.5ppd x 30 years)


Smokeless Tobacco Use:  No


Alcohol Use:  occasionally


Drug Use:  none


Marital Status:  


Housing status:  lives with family


Occupational Status:  unemployed





Immunizations


History of Influenza Vaccine:  No


Influenza Vaccine Date:  Oct 20, 2008


History of Tetanus Vaccine?:  No


History of Pneumococcal:  Yes


History of Hepatitis B Vaccine:  No





Multi-Drug Resistant Organisms


History of MDRO:  No





Allergies


Coded Allergies:  


     ACE Inhibitors (Verified  Allergy, Unknown, UNKNOWN RXN, 2/21/18)


     Zolpidem (Verified  Adverse Reaction, Intermediate, other, 2/21/18)


 irritability, restless legs, wanted to wake up at night


 during sleep





Home Medications


Scheduled


Albuterol Hfa (Ventolin Hfa), 2 PUFFS INH Q4H


Citalopram (Citalopram Hydrobromide), 40 MG PO DAILY


Fluticasone Furoate-Vilanterol (Breo Ellipta), 1 PUFF INH QAM


Omeprazole (Omeprazole), 1 TAB PO DAILY


Umeclidinium Bromide (Incruse Ellipta), 1 PUFF INH DAILY





Scheduled PRN


Albuterol Sulfate (Proair Respiclick), 2 PUFFS INH QID PRN for SOB/Wheezing


Ibuprofen (Advil), 400-600 MG PO Q6H PRN for Pain


Ipratropium-Albuterol (Duoneb), 1 TREATMENT INH Q6H PRN for SOB/Wheezing





Review of Systems


See HPI for pertinent positives & negatives. All other systems reviewed and 

were otherwise negative





Physical Exam


Vital Signs











  Date Time  Temp Pulse Resp B/P (MAP) Pulse Ox O2 Delivery O2 Flow Rate FiO2


 


2/21/18 17:00 36.8 95 20 122/70 91 Nasal Cannula 4.0 


 


2/21/18 16:27 36.8 92 22  94 Nasal Cannula 4.0 


 


2/21/18 15:53  94 20 130/71 94 Nasal Cannula 4.0 


 


2/21/18 14:46  103 15 104/66 92 Nasal Cannula 2.0 


 


2/21/18 14:07  110      


 


2/21/18 13:27     93 Nasal Cannula 4.0 


 


2/21/18 13:22 38.0 118 40 139/72 80 Room Air  








General Appearance:  WD/WN, no apparent distress


Head:  normocephalic, atraumatic


Eyes:  normal inspection, PERRL, EOMI, sclerae normal


ENT:  hearing grossly normal, pharynx normal, + pertinent finding (mucous 

membranes moist)


Neck:  supple, no JVD, trachea midline


Respiratory/Chest:  no respiratory distress, no accessory muscle use, + 

decreased breath sounds, + wheezing


Cardiovascular:  regular rate, rhythm, no murmur, normal peripheral pulses


Abdomen/GI:  normal bowel sounds, non tender, soft


Extremities/Musculoskelatal:  normal inspection, normal capillary refill, no 

pedal edema, normal range of motion, non-tender


Neurologic/Psych:  alert, normal mood/affect, oriented x 3


Skin:  normal color, warm/dry





Diagnostics


Laboratory Results





Results Past 24 Hours








Test


  2/21/18


13:39 2/21/18


13:58 2/21/18


14:00 2/21/18


14:21 Range/Units


 


 


White Blood Count 9.22    4.8-10.8  K/uL


 


Red Blood Count 4.34    4.2-5.4  M/uL


 


Hemoglobin 14.2    12.0-16.0  g/dL


 


Hematocrit 41.7    37-47  %


 


Mean Corpuscular Volume 96.1      fL


 


Mean Corpuscular Hemoglobin 32.7    25-34  pg


 


Mean Corpuscular Hemoglobin


Concent 34.1


  


  


  


  32-36  g/dl


 


 


Platelet Count 161    130-400  K/uL


 


Mean Platelet Volume 9.6    7.4-10.4  fL


 


Neutrophils (%) (Auto) 88.3     %


 


Lymphocytes (%) (Auto) 5.1     %


 


Monocytes (%) (Auto) 5.9     %


 


Eosinophils (%) (Auto) 0.2     %


 


Basophils (%) (Auto) 0.2     %


 


Neutrophils # (Auto) 8.14    1.4-6.5  K/uL


 


Lymphocytes # (Auto) 0.47    1.2-3.4  K/uL


 


Monocytes # (Auto) 0.54    0.11-0.59  K/uL


 


Eosinophils # (Auto) 0.02    0-0.5  K/uL


 


Basophils # (Auto) 0.02    0-0.2  K/uL


 


RDW Standard Deviation 49.8    36.4-46.3  fL


 


RDW Coefficient of Variation 14.1    11.5-14.5  %


 


Immature Granulocyte % (Auto) 0.3     %


 


Immature Granulocyte # (Auto) 0.03    0.00-0.02  K/uL


 


Sodium Level 135    136-145  mmol/L


 


Potassium Level 3.6    3.5-5.1  mmol/L


 


Chloride Level 100      mmol/L


 


Carbon Dioxide Level 25    21-32  mmol/L


 


Anion Gap 10.0   16.0 16-25  mmol/L


 


Blood Urea Nitrogen 12    7-18  mg/dl


 


Creatinine


  0.68


  


  


  


  0.60-1.20


mg/dl


 


Est Creatinine Clear Calc


Drug Dose 80.2


  


  


  


   ml/min


 


 


Estimated GFR (


American) 111.0


  


  


  


   


 


 


Estimated GFR (Non-


American 95.7


  


  


  


   


 


 


BUN/Creatinine Ratio 17.3    10-20  


 


Random Glucose 89    70-99  mg/dl


 


Calcium Level 8.8    8.5-10.1  mg/dl


 


Magnesium Level 2.0    1.8-2.4  mg/dl


 


Total Bilirubin 0.4    0.2-1  mg/dl


 


Direct Bilirubin < 0.1    0-0.2  mg/dl


 


Aspartate Amino Transf


(AST/SGOT) 25


  


  


  


  15-37  U/L


 


 


Alanine Aminotransferase


(ALT/SGPT) 26


  


  


  


  12-78  U/L


 


 


Alkaline Phosphatase 81      U/L


 


Total Creatine Kinase 125      U/L


 


Creatine Kinase MB 1.7    0.5-3.6  ng/ml


 


Creatine Kinase MB Ratio 1.4    0-3.0  


 


Troponin I < 0.015    0-0.045  ng/ml


 


Total Protein 7.3    6.4-8.2  gm/dl


 


Albumin 3.7    3.4-5.0  gm/dl


 


Bedside Lactic Acid Venous


  


  1.11


  


  


  0.90-1.70


mmol/L


 


Influenza Type A Antigen   POS for Influ A  NEG  


 


Influenza Type B Antigen   Neg for Influ B  NEG  


 


Bedside Hemoglobin    15.3 12.0-16.0  g/dl


 


Bedside Hematocrit    45 37-47  %


 


Bedside Sodium    136 135-144  mEq/L


 


Bedside Potassium    3.9 3.3-5.0  mEq/L


 


Bedside Chloride    98 101-112  mEq/L


 


Bedside Total CO2    26 24-31  mEq/l


 


Bedside Blood Urea Nitrogen    13 7-18  mg/dl


 


Bedside Creatinine    0.5 0.6-1.3  mg/dl


 


Bedside Glucose (other)    89 70-99  mg/dl


 


Bedside Ionized Calcium (Heide)


  


  


  


  1.06


  1.12-1.32


mmol/l


 


Test


  2/21/18


14:30 2/21/18


15:50 


  


  Range/Units


 


 


Prothrombin Time


  10.5


  


  


  


  9.0-12.0


SECONDS


 


Prothromb Time International


Ratio 1.0


  


  


  


  0.9-1.1  


 


 


Activated Partial


Thromboplast Time 28.4


  


  


  


  21.0-31.0


SECONDS


 


Partial Thromboplastin Ratio 1.1     


 


Urine Color  YELLOW    


 


Urine Appearance  CLEAR   CLEAR  


 


Urine pH  6.0   4.5-7.5  


 


Urine Specific Gravity  1.020   1.000-1.030  


 


Urine Protein  NEG   NEG  


 


Urine Glucose (UA)  NEG   NEG  


 


Urine Ketones  2+   NEG  


 


Urine Occult Blood  NEG   NEG  


 


Urine Nitrite  NEG   NEG  


 


Urine Bilirubin  NEG   NEG  


 


Urine Urobilinogen  NEG   NEG  


 


Urine Leukocyte Esterase  NEG   NEG  


 


Urine Hyaline Casts (Auto)  0   0-5  /lpf


 


Urine RBC  0-4   0-4  /hpf


 


Urine WBC  0   0-5  /hpf


 


Urine Epithelial Cells  10-20   0-5  /lpf


 


Urine Bacteria  NEG   NEG  








Microbiology Results


2/21/18 Blood Culture, Received


          Pending


2/21/18 Blood Culture, Received


          Pending





Diagnostic Radiology


CXR:


IMPRESSION:  Mild right lower lung reticulonodular interstitial thickening which


may reflect a mild infectious process such as bronchiolitis.





EKG


EKG: sinus tachycardia, non-specific ST changes anterior, lateral leads





Impression


Assessment and Plan


HYPOXIA SECONDARY TO INFLUENZA AND COPD EXACERBATION


POSSIBLE PNEUMONIA 


Pt with onset fever, myalgias, cough 2 days ago with increased SOB and hypoxia 

with home sat's in mid 80's on RA. In ER O2 sat 80% on RA up to 93% on 4L NC. T

: 38. Pulse: 118. Negative POC lactic acid. Troponin negative. CXR: Mild right 

lower lung reticulonodular interstitial thickening which may reflect a mild 

infectious process such as bronchiolitis. Pt given zosyn, duoneb, solumedrol 

125mg


-tamilfu


-zosyn


-NSS 80ml/hr


-xopenex/atrovent neb


-supplemental O2, continue HS O2


-solumedrol 40mg QH


-continue home inhalers


-repeat labs in am





GERD


-continue PPI





DEPRESSION/ANXIETY


-continue celexa 





TOBACCO ABUSE


-smoking cessation discussed


-nicotine patch





DVT Prophylaxis


-SCDs





Disposition


admit med/surg


Full Code


Follows with Dr Mc for routine care





Pt was seen with Dr Billy. See addendum





ATTENDING NOTE : 


pt seen and examined, care co -ordniated with Marcy Serna PA-C 





56 yo F presented with hypoxia , cough 


flu like symptom 


Influenza A ag positive 


started on Tamiflu 


IV hydration 


Duo neb/IV steroids for COPD exacerbation 





Dianne Billy MD





Level of Care


Med/Surg





Advanced Directives


Existing Living Will:  No


Existing Power of :  No





Resuscitation Status


FULL RESUSCITATION





VTE Prophylaxis


VTE Risk Assessment Done? Y/N:  Yes


Risk Level:  Moderate


Given or contraindicated:  SCD's





Additional Copies To


April Mc D.O.











<Electronically signed by Marcy House PA-C>


<Electronically signed by Dianne Billy MD>


  Signed:  02/21/18 2003


  Signed:  02/21/18 2128





Physical Exam (per Admitting):  


   General Appearance:  WD/WN, no apparent distress


   Head:  normocephalic, atraumatic


   Eyes:  normal inspection, PERRL, EOMI, sclerae normal


   ENT:  hearing grossly normal, pharynx normal, + pertinent finding (mucous 

membranes moist)


   Neck:  supple, no JVD, trachea midline


   Respiratory/Chest:  no respiratory distress, no accessory muscle use, + 

decreased breath sounds, + wheezing


   Cardiovascular:  regular rate, rhythm, no murmur, normal peripheral pulses


   Abdomen/GI:  normal bowel sounds, non tender, soft


   Extremities/Musculoskelatal:  normal inspection, normal capillary refill, no 

pedal edema, normal range of motion, non-tender


   Neurologic/Psych:  alert, normal mood/affect, oriented x 3


   Skin:  normal color, warm/dry





Hospital Course





Pt is 56 y/o F with PMH COPD, nocturnal hypoxemia, depression, GERD, tobacco 

use presented to ER with c/o fever, increased SOB and cough. Pt reports 2 days 

ago started with myalgias, cough, increased SOB and tactile fever. Uses oxygen @

2L HS chronically.


Negative POC lactic acid. Troponin negative. CXR: Mild right lower lung 

reticulonodular interstitial thickening which may reflect a mild infectious 

process such as bronchiolitis.





HYPOXIA SECONDARY TO INFLUENZA AND COPD EXACERBATION


POSSIBLE PNEUMONIA 


Started on Azithromycin,Ceftriaxone , DuoNeb, and Solumedrol and Tamiflu


Supplemental O2, continue HS O2


Continue home inhalers


Remains stable ,minimal improvement 


Increase ambulation


Clinically better 


Solumedrol changed to Orap Prednisone from 2/25


Much better today 


Increase ambulation -doing better without any desaturation 


Has Nighttime O2 at home 





GERD


Continue PPI





DEPRESSION/ANXIETY


Continue Celexa 





TOBACCO ABUSE


Smoking cessation discussed


Nicotine patch





DVT Prophylaxis


-SCDs





Disposition


admit med/surg


Full Code


Follows with Dr Mc for routine care


Likely discharge today


Total time spent on discharge = 35 minutes


This includes examination of the patient, discharge planning, medication 

reconciliation, and communication with other providers.





Discharge Instructions


Date of Service


Feb 26, 2018.





Admission


Reason for Admission:  Influenza A





Discharge


Discharge Diagnosis / Problem:  COPD exacerbation,Influenza A





Discharge Goals


Goal(s):  Prevent Disease Progression





Activity Recommendations


Activity Limitations:  resume your previous activity





.





Instructions / Follow-Up


Instructions / Follow-Up


Dr Sim on 03/012/18 at 12:45 PM ( Dr Mc is away)





Current Hospital Diet


Patient's current hospital diet: AHA Diet (Heart Healthy)





Discharge Diet


Recommended Diet:  AHA Diet (Heart Healthy)





Pending Studies


Studies pending at discharge:  no





Medical Emergencies








.


Who to Call and When:





Medical Emergencies:  If at any time you feel your situation is an emergency, 

please call 911 immediately.





.





Non-Emergent Contact


Non-Emergency issues call your:  Primary Care Provider





.





Past History


Medical & Surgical History:  


(1) COPD (chronic obstructive pulmonary disease)


(2) COPD exacerbation


(3) HTN (hypertension)


(4) Influenza A


(5) GERD (gastroesophageal reflux disease)


(6) No pertinent past surgical history


(7) Active smoker


.








"Provider Documentation" section prepared by Alivia Deluca.








.





VTE Core Measure


Inpt VTE Proph given/why not?:  SCD's











<Electronically signed by Alivia Deluca M.D.>





  Signed:  02/26/18 1400





Additional Copies To


April Mc D.O.

## 2018-03-03 ENCOUNTER — HOSPITAL ENCOUNTER (EMERGENCY)
Dept: HOSPITAL 45 - C.EDB | Age: 58
Discharge: HOME | End: 2018-03-03
Payer: COMMERCIAL

## 2018-03-03 VITALS
HEIGHT: 60.98 IN | BODY MASS INDEX: 30.47 KG/M2 | HEIGHT: 60.98 IN | WEIGHT: 161.38 LBS | BODY MASS INDEX: 30.47 KG/M2 | WEIGHT: 161.38 LBS

## 2018-03-03 VITALS — OXYGEN SATURATION: 95 % | HEART RATE: 82 BPM

## 2018-03-03 VITALS — OXYGEN SATURATION: 92 % | HEART RATE: 101 BPM | DIASTOLIC BLOOD PRESSURE: 104 MMHG | SYSTOLIC BLOOD PRESSURE: 138 MMHG

## 2018-03-03 VITALS — OXYGEN SATURATION: 95 %

## 2018-03-03 VITALS — TEMPERATURE: 98.24 F

## 2018-03-03 DIAGNOSIS — Z99.81: ICD-10-CM

## 2018-03-03 DIAGNOSIS — E86.0: Primary | ICD-10-CM

## 2018-03-03 DIAGNOSIS — F17.210: ICD-10-CM

## 2018-03-03 DIAGNOSIS — Z88.8: ICD-10-CM

## 2018-03-03 DIAGNOSIS — E78.5: ICD-10-CM

## 2018-03-03 DIAGNOSIS — K21.9: ICD-10-CM

## 2018-03-03 DIAGNOSIS — M79.7: ICD-10-CM

## 2018-03-03 DIAGNOSIS — F32.9: ICD-10-CM

## 2018-03-03 DIAGNOSIS — Z79.899: ICD-10-CM

## 2018-03-03 DIAGNOSIS — R19.7: ICD-10-CM

## 2018-03-03 DIAGNOSIS — I10: ICD-10-CM

## 2018-03-03 DIAGNOSIS — J44.9: ICD-10-CM

## 2018-03-03 LAB
ALBUMIN SERPL-MCNC: 3 GM/DL (ref 3.4–5)
ALP SERPL-CCNC: 93 U/L (ref 45–117)
ALT SERPL-CCNC: 37 U/L (ref 12–78)
AST SERPL-CCNC: 15 U/L (ref 15–37)
BUN SERPL-MCNC: 15 MG/DL (ref 7–18)
CALCIUM SERPL-MCNC: 8.7 MG/DL (ref 8.5–10.1)
CO2 SERPL-SCNC: 31 MMOL/L (ref 21–32)
CREAT SERPL-MCNC: 0.95 MG/DL (ref 0.6–1.2)
EOSINOPHIL NFR BLD AUTO: 306 K/UL (ref 130–400)
GLUCOSE SERPL-MCNC: 112 MG/DL (ref 70–99)
HCT VFR BLD CALC: 37 % (ref 37–47)
HGB BLD-MCNC: 12.9 G/DL (ref 12–16)
MCH RBC QN AUTO: 33 PG (ref 25–34)
MCHC RBC AUTO-ENTMCNC: 34.9 G/DL (ref 32–36)
MCV RBC AUTO: 94.6 FL (ref 80–100)
PMV BLD AUTO: 9.2 FL (ref 7.4–10.4)
POTASSIUM SERPL-SCNC: 3.3 MMOL/L (ref 3.5–5.1)
PROT SERPL-MCNC: 6.2 GM/DL (ref 6.4–8.2)
RED CELL DISTRIBUTION WIDTH CV: 13.7 % (ref 11.5–14.5)
RED CELL DISTRIBUTION WIDTH SD: 47.2 FL (ref 36.4–46.3)
SODIUM SERPL-SCNC: 140 MMOL/L (ref 136–145)
WBC # BLD AUTO: 16.97 K/UL (ref 4.8–10.8)

## 2018-03-03 NOTE — DIAGNOSTIC IMAGING REPORT
CHEST 2 VIEWS ROUTINE



CLINICAL HISTORY: 57 years-old Female presenting with eval PNA. 



TECHNIQUE: PA and lateral views of the chest were obtained.



COMPARISON: 2/21/2018.



FINDINGS:

Cardiomediastinal silhouette normal. Lungs and pleural spaces clear. Osseous

structures normal. Upper abdomen normal.



IMPRESSION:

1.  No acute cardiopulmonary disease.







Electronically signed by:  Alex Butler M.D.

3/3/2018 4:59 PM



Dictated Date/Time:  3/3/2018 4:59 PM

## 2018-03-03 NOTE — EMERGENCY ROOM VISIT NOTE
ED Visit Note


First contact with patient:  15:53


CHIEF  COMPLAINT:  Weakness, dehydration





HISTORY OF PRESENTING ILLNESS: This is a 57-year-old female who presents to the 

emergency department with complaint of weakness and concern for dehydration.  

She states that she was seen at [facility] and was sent here for IV 

fluids.  She states that she was recently admitted to the hospital for COPD 

exacerbation and influenza, she was discharged home 5 days ago.  She states she 

had been feeling better, but began to have some low-grade fevers over the past 

2 days, watery and loose diarrhea several times a day, and feeling more and 

more weak and tired.  She denies any headaches, neck pain or stiffness, chest 

pain, shortness of breath, dizziness or syncope, abdominal pain, nausea or 

vomiting, bloody or black stools, dysuria, urinary frequency, or rash.  She 

states that she has a chronic cough, this has not been worse than usual.  She 

uses 2 L of oxygen at night and as needed for her COPD, but has not been 

requiring any additional oxygen or feeling more short of breath than usual.





REVIEW OF SYSTEMS: A complete 10 point review of systems was reviewed with the 

patient with pertinent positives and negatives as per history of present 

illness. All else were negative.





PAST MEDICAL HISTORY: Reviewed in chart.





SOCIAL HISTORY: Lives at home.  She is a current everyday smoker.  She denies 

alcohol and recreational drug use.





ALLERGIES: Reviewed in chart.





PHYSICAL EXAM:


CONSTITUTIONAL: Pleasant and cooperative.  No acute distress.  Moderately 

dehydrated, but otherwise well appearing and well nourished. 


HEENT: Normocephalic, atraumatic. Pupils equal, round and reactive to light, 

EOMI. TMs normal. Pharynx normal.  Dry mucous membranes.


NECK: Supple, full active range of motion without discomfort.  No cervical 

adenopathy.


RESPIRATORY: Diminished to auscultation bilaterally with scant inspiratory/

expiratory wheezing.  No crackles, rhonchi or stridor. Equal expansion 

bilaterally.


CARDIOVASCULAR: Regular rate and rhythm with no murmurs, rubs or gallops. 

Normal peripheral perfusion. No edema.


GASTROINTESTINAL: Soft, nontender, nondistended. No palpable masses or HSM. 

Bowel sounds present in all quadrants. 


MUSCULOSKELETAL: Full range of motion of all joints without discomfort.  No leg 

pain or swelling.


INTEGUMENTARY: No rash or other significant dermatologic conditions noted.


NEUROLOGIC: Alert and oriented X 4 with normal affect.  Cranial nerves II-XII 

grossly intact. No focal neurologic deficits noted.  Normal strength and 

sensation all 4 extremities.  Normal speech.  Normal gait observed.








ED COURSE AND MEDICAL DECISION MAKING: 





CC: Patient presenting with complaint of weakness, concern for dehydration.





DIFFERENTIAL DIAGNOSIS:  Includes, but not limited to dehydration, electrolyte 

abnormality, anemia, acute coronary syndrome, infectious process such as UTI, 

pneumonia, bronchitis, viral URI, gastroenteritis, C. difficile infection, 

among others.





INTERPRETATION OF LABS: Leukocytosis, no anemia, mild hypokalemia, no other 

significant electrolyte abnormalities, normal renal function, normal liver 

enzymes.  Negative troponin.  UA appears consistent with contaminant, not 

infection.





IMAGING:  


CHEST 2 VIEWS ROUTINE





CLINICAL HISTORY: 57 years-old Female presenting with eval PNA. 





TECHNIQUE: PA and lateral views of the chest were obtained.





COMPARISON: 2/21/2018.





FINDINGS:


Cardiomediastinal silhouette normal. Lungs and pleural spaces clear. Osseous


structures normal. Upper abdomen normal.





IMPRESSION:


1.  No acute cardiopulmonary disease.








EKG: Shows normal sinus rhythm with a rate of 90 bpm, no ectopy, no acute 

ischemic changes, no significant changes when compared to EKG from 2/21/2018 by 

my interpretation.





MEDICATION RECONCILIATION:  I attest that I have personally reviewed the patient

's current medication list.





INITIAL VITAL SIGNS REVIEW:  I reviewed the patient's initial vital signs and 

interpret them as follows: T: Afebrile;  BP: Hypertensive;  HR: Within normal 

limits;  RR: Within normal limit;  Pulse Ox: Within normal limits on room air.


Blood pressure screening: The patient was found to have an elevated blood 

pressure and was referred to their primary doctor for recheck and further 

treatment.





SUMMARY: 


Patient was evaluated at bedside, history and physical exam performed.


Patient is alert and oriented, no acute distress, resting, and stretcher.


Patient does appear moderately dehydrated with dry skin and dry mucous 

membranes.  She does report she has been having diarrhea recently.


Lungs are diminished throughout with scant wheezes, she denies any shortness of 

breath, but does request a nebulizer treatment.


Abdomen is soft and nontender throughout.


Review of the patient's chart shows recent admission for COPD exacerbation and 

influenza type a.  Patient was treated with IV antibiotics during her admission.


Given the complaint of diarrhea and some low-grade fevers, C. difficile is a 

consideration.


Orders were placed at bedside for labs, UA, IV fluids for hydration, EKG, chest 

x-ray to evaluate for pneumonia.


Patient discussed with Dr. Zarate, who agrees with my assessment and plan.


Labs and imaging reviewed as above, noting leukocytosis, which is suspected to 

be secondary to steroid use, given no identifiable source for infection and she 

is afebrile.


Patient was able to provide a small stool sample, this was formed and not liquid

, and she has a benign abdominal exam, I doubt C. difficile infection at this 

time.


Patient reassessed multiple times throughout ED stay, she is feeling better 

after neb treatment and IV fluids, asking to go home. She is tolerating PO well.


Patient was ambulated in the department, she was noted to have sats of 87%, but 

denied any SOB or dizziness.  Patient states she is feeling much better and is 

adamant that she wants to go home. She does have home oxygen that she uses as 

needed as well. I feel the patient can be safely discharged home.


Patient was updated on all results and plan for discharge, she was encouraged 

to follow closely with her PCP, she states she will get an appointment on 

Monday.


Patient was also given strict return precautions should her symptoms worsen, 

she verbalized understanding.


Patient was discharged home in stable condition and ambulatory.


Problem List


Medical Problems:


(1) COPD (chronic obstructive pulmonary disease)


Status: Chronic  





(2) Depression


Status: Chronic  





(3) Fibromyalgia


Status: Chronic  





(4) GERD (gastroesophageal reflux disease)


Status: Chronic  





(5) HLD (hyperlipidemia)


Status: Chronic  





(6) HTN (hypertension)


Status: Chronic  





Surgical Problems:


(1) Hx of blepharoplasty


Status: Resolved  





(2) No pertinent past surgical history


Status: Chronic  





Social History Problems:


(1) Active smoker


Status: Chronic  











Current/Historical Medications


Scheduled


Albuterol Hfa (Ventolin Hfa), 2 PUFFS INH Q4H


Citalopram (Citalopram Hydrobromide), 40 MG PO DAILY


Fluticasone Furoate-Vilanterol (Breo Ellipta), 1 PUFF INH QAM


Omeprazole (Omeprazole), 1 TAB PO DAILY


Prednisone Tab (Prednisone), 10 MG PO UD


Umeclidinium Bromide (Incruse Ellipta), 1 PUFF INH DAILY





Scheduled PRN


Albuterol Sulfate (Proair Respiclick), 2 PUFFS INH QID PRN for SOB/Wheezing


Ibuprofen (Advil), 400-600 MG PO Q6H PRN for Pain


Ipratropium-Albuterol (Duoneb), 1 TREATMENT INH Q6H PRN for SOB/Wheezing





Allergies


Coded Allergies:  


     ACE Inhibitors (Verified  Allergy, Unknown, UNKNOWN RXN, 2/21/18)


     Zolpidem (Verified  Adverse Reaction, Intermediate, other, 2/21/18)


 irritability, restless legs, wanted to wake up at night


 during sleep





Vital Signs











  Date Time  Temp Pulse Resp B/P (MAP) Pulse Ox O2 Delivery O2 Flow Rate FiO2


 


3/3/18 19:52  101 18 138/104 92   


 


3/3/18 18:55  106 18 147/88 91 Room Air  


 


3/3/18 17:08  99 20 156/89 99 Nebulizer  


 


3/3/18 16:25  82 18  95 Room Air  


 


3/3/18 16:14  94      


 


3/3/18 16:13     95 Room Air  


 


3/3/18 15:50 36.8 58 18 147/70 95 Room Air  











Laboratory Results


3/3/18 16:21








Red Blood Count 3.91, Mean Corpuscular Volume 94.6, Mean Corpuscular Hemoglobin 

33.0, Mean Corpuscular Hemoglobin Concent 34.9, Mean Platelet Volume 9.2





3/3/18 16:21

















Test


  3/3/18


16:21 3/3/18


17:40


 


White Blood Count


  16.97 K/uL


(4.8-10.8) 


 


 


Red Blood Count


  3.91 M/uL


(4.2-5.4) 


 


 


Hemoglobin


  12.9 g/dL


(12.0-16.0) 


 


 


Hematocrit 37.0 % (37-47)  


 


Mean Corpuscular Volume


  94.6 fL


() 


 


 


Mean Corpuscular Hemoglobin


  33.0 pg


(25-34) 


 


 


Mean Corpuscular Hemoglobin


Concent 34.9 g/dl


(32-36) 


 


 


Platelet Count


  306 K/uL


(130-400) 


 


 


Mean Platelet Volume


  9.2 fL


(7.4-10.4) 


 


 


RDW Standard Deviation


  47.2 fL


(36.4-46.3) 


 


 


RDW Coefficient of Variation


  13.7 %


(11.5-14.5) 


 


 


Neutrophils % (Manual) 72.1 %  


 


Lymphocytes % (Manual) 22.6 %  


 


Monocytes % (Manual) 3.5 %  


 


Metamyelocytes % 0.9 %  


 


Myelocytes % 0.9 %  


 


Neutrophils # (Manual)


  12.24 K/uL


(1.4-6.5) 


 


 


Total Absolute Neutrophils


  12.24 K/uL


(1.4-6.5) 


 


 


Lymphocytes # (Manual)


  3.84 K/uL


(1.2-3.4) 


 


 


Total Absolute Lymphocytes


  3.84 K/uL


(1.2-3.4) 


 


 


Monocytes # (Manual)


  0.59 K/uL


(0.11-0.59) 


 


 


Metamyelocytes #


  0.15 K/uL


(0-0) 


 


 


Myelocytes #


  0.15 K/uL


(0-0) 


 


 


Red Blood Cell Morphology Unremarkable  


 


Anion Gap


  7.0 mmol/L


(3-11) 


 


 


Est Creatinine Clear Calc


Drug Dose 58.3 ml/min 


  


 


 


Estimated GFR (


American) 76.0 


  


 


 


Estimated GFR (Non-


American 65.6 


  


 


 


BUN/Creatinine Ratio 15.7 (10-20)  


 


Calcium Level


  8.7 mg/dl


(8.5-10.1) 


 


 


Total Bilirubin


  0.4 mg/dl


(0.2-1) 


 


 


Aspartate Amino Transf


(AST/SGOT) 15 U/L (15-37) 


  


 


 


Alanine Aminotransferase


(ALT/SGPT) 37 U/L (12-78) 


  


 


 


Alkaline Phosphatase


  93 U/L


() 


 


 


Troponin I


  < 0.015 ng/ml


(0-0.045) 


 


 


Total Protein


  6.2 gm/dl


(6.4-8.2) 


 


 


Albumin


  3.0 gm/dl


(3.4-5.0) 


 


 


Globulin


  3.2 gm/dl


(2.5-4.0) 


 


 


Albumin/Globulin Ratio 0.9 (0.9-2)  


 


Chemistry Specimen Hemolysis   


 


Urine Color  YELLOW 


 


Urine Appearance  CLOUDY (CLEAR) 


 


Urine pH  6.5 (4.5-7.5) 


 


Urine Specific Gravity


  


  1.017


(1.000-1.030)


 


Urine Protein  NEG (NEG) 


 


Urine Glucose (UA)  NEG (NEG) 


 


Urine Ketones  NEG (NEG) 


 


Urine Occult Blood  NEG (NEG) 


 


Urine Nitrite  NEG (NEG) 


 


Urine Bilirubin  NEG (NEG) 


 


Urine Urobilinogen  NEG (NEG) 


 


Urine Leukocyte Esterase  TRACE (NEG) 


 


Urine WBC (Auto)  1-5 /hpf (0-5) 


 


Urine RBC (Auto)  0-4 /hpf (0-4) 


 


Urine Hyaline Casts (Auto)  0 /lpf (0-5) 


 


Urine Epithelial Cells (Auto)


  


  20-30 /lpf


(0-5)


 


Urine Bacteria (Auto)  NEG (NEG) 











Medications Administered











 Medications


  (Trade)  Dose


 Ordered  Sig/Carl


 Route  Start Time


 Stop Time Status Last Admin


Dose Admin


 


 Albuterol/


 Ipratropium


  (Duoneb)  12 ml  ONE  ONCE


 INH  3/3/18 16:15


 3/3/18 16:16 DC 3/3/18 16:25


12 ML


 


 Sodium Chloride  1,000 ml @ 


 999 mls/hr  Q1H1M STAT


 IV  3/3/18 16:03


 3/3/18 17:03 DC 3/3/18 16:03


999 MLS/HR











Departure Information


Impression





 Primary Impression:  


 Dehydration


 Additional Impression:  


 Diarrhea





Dispostion


Home / Self-Care





Condition


GOOD





Referrals


April Mc D.O. (PCP)





Patient Instructions


ED Dehydration, ED Diet [facility], [facility]





Additional Instructions





You have been treated in the Emergency Department today for Dehydration.





It is ESSENTIAL that you maintain adequate hydration with oral fluids! Some 

suggestions include:


-   Water is the IDEAL replacement for lost fluids. You should initially sip at 

the water to help facilitate increased intestinal absorption rate and to 

decrease the possibility of nausea/vomiting.


-   Carbohydrate/Electrolyte-Containing Drinks (i.e. Gatorade, Powerade, 

Pedialyte). All of these are good choices, but it is important to remember that 

all of these drinks contain a high concentration of sugar.


-   Popsicles, ice chips, and fruit juices are all other options.


-   My FAVORITE dehydration remedy is to mix a 1:1 solution of bottled Gatorade 

with bottled water. This dilution allows for a palatable flavor with added 

benefit of a reduction in the amount of sugar consumption.





Stick with a bland diet for the next few days until your diarrhea has improved.

  If your diarrhea persists, you should have stool cultures done by your 

primary care provider.





As with all Emergency Department visits, you should follow-up with your Primary 

Care Provider in 2-3 days for reevaluation.





Return to the Emergency Department if your current symptoms worsen despite 

treatment course outlined above, or if you develop any of the following symptoms

: increased thirst, weakness, dizziness, increased shortness of breath, chest 

pain, palpitations, passing out, decreased urine output, or any other concerns.





Problem Qualifiers








 Additional Impression:  


 Diarrhea


 Diarrhea type:  unspecified type  Qualified Codes:  R19.7 - Diarrhea, 

unspecified